# Patient Record
Sex: MALE | Race: WHITE | NOT HISPANIC OR LATINO | Employment: FULL TIME | URBAN - METROPOLITAN AREA
[De-identification: names, ages, dates, MRNs, and addresses within clinical notes are randomized per-mention and may not be internally consistent; named-entity substitution may affect disease eponyms.]

---

## 2017-04-23 ENCOUNTER — HOSPITAL ENCOUNTER (EMERGENCY)
Facility: HOSPITAL | Age: 42
Discharge: HOME | End: 2017-04-23
Attending: EMERGENCY MEDICINE | Admitting: EMERGENCY MEDICINE
Payer: COMMERCIAL

## 2017-04-23 VITALS
HEART RATE: 75 BPM | OXYGEN SATURATION: 98 % | TEMPERATURE: 99.6 F | SYSTOLIC BLOOD PRESSURE: 143 MMHG | DIASTOLIC BLOOD PRESSURE: 84 MMHG | WEIGHT: 155 LBS | RESPIRATION RATE: 16 BRPM | HEIGHT: 67 IN | BODY MASS INDEX: 24.33 KG/M2

## 2017-04-23 DIAGNOSIS — S39.012A LOW BACK STRAIN, INITIAL ENCOUNTER: Primary | ICD-10-CM

## 2017-04-23 PROCEDURE — 99283 EMERGENCY DEPT VISIT LOW MDM: CPT

## 2017-04-23 RX ORDER — IBUPROFEN 600 MG/1
600 TABLET ORAL EVERY 6 HOURS PRN
Qty: 30 TABLET | Refills: 0 | Status: SHIPPED | OUTPATIENT
Start: 2017-04-23 | End: 2020-01-03 | Stop reason: ALTCHOICE

## 2017-04-23 RX ORDER — CYCLOBENZAPRINE HCL 10 MG
10 TABLET ORAL 2 TIMES DAILY PRN
Qty: 10 TABLET | Refills: 0 | Status: SHIPPED | OUTPATIENT
Start: 2017-04-23 | End: 2020-01-03 | Stop reason: ALTCHOICE

## 2020-01-03 ENCOUNTER — OFFICE VISIT (OUTPATIENT)
Dept: FAMILY MEDICINE CLINIC | Facility: CLINIC | Age: 45
End: 2020-01-03
Payer: COMMERCIAL

## 2020-01-03 VITALS
SYSTOLIC BLOOD PRESSURE: 118 MMHG | HEIGHT: 68 IN | BODY MASS INDEX: 24.4 KG/M2 | OXYGEN SATURATION: 99 % | WEIGHT: 161 LBS | TEMPERATURE: 99.1 F | DIASTOLIC BLOOD PRESSURE: 72 MMHG | HEART RATE: 70 BPM

## 2020-01-03 DIAGNOSIS — Z11.4 ENCOUNTER FOR SCREENING FOR HUMAN IMMUNODEFICIENCY VIRUS (HIV): ICD-10-CM

## 2020-01-03 DIAGNOSIS — Z12.5 SCREENING PSA (PROSTATE SPECIFIC ANTIGEN): ICD-10-CM

## 2020-01-03 DIAGNOSIS — Z76.89 ENCOUNTER TO ESTABLISH CARE: ICD-10-CM

## 2020-01-03 DIAGNOSIS — Z00.00 WELL ADULT EXAM: Primary | ICD-10-CM

## 2020-01-03 PROCEDURE — 99386 PREV VISIT NEW AGE 40-64: CPT | Performed by: FAMILY MEDICINE

## 2020-01-03 NOTE — PROGRESS NOTES
[id number] [address] [place] [address]    NAME: Cyndi Del Rela  AGE: 38 y o  SEX: male  : 1975     DATE: 1/3/2020     Assessment and Plan:     Problem List Items Addressed This Visit        Other    Well adult exam - Primary    Relevant Orders    CBC and differential    Comprehensive metabolic panel    Lipid Panel with Direct LDL reflex        Patient was counseled and accepted HIV screening  Immunizations and preventive care screenings were discussed with patient today  Appropriate education was printed on patient's after visit summary  Counseling:  Alcohol/drug use: discussed moderation in alcohol intake, the recommendations for healthy alcohol use, and avoidance of illicit drug use  Dental Health: discussed importance of regular tooth brushing, flossing, and dental visits  Injury prevention: discussed safety/seat belts, safety helmets, smoke detectors, carbon dioxide detectors, and smoking near bedding or upholstery  Sexual health: discussed sexually transmitted diseases, partner selection, use of condoms, avoidance of unintended pregnancy, and contraceptive alternatives  · Exercise: the importance of regular exercise/physical activity was discussed  Recommend exercise 3-5 times per week for at least 30 minutes  Return in about 1 year (around 1/3/2021) for Annual physical      Chief Complaint:     Chief Complaint   Patient presents with    Establish Care      History of Present Illness:     Adult Annual Physical   Patient here for a comprehensive physical exam  The patient reports no problems  Wants labs  Dad had prostate cancer  Would like PSA  Diet and Physical Activity  · Diet/Nutrition: vegetarian  · Exercise: moderate cardiovascular exercise        Depression Screening  PHQ-9 Depression Screening    PHQ-9:    Frequency of the following problems over the past two weeks:       Little interest or pleasure in doing things:  0 - not at all  Feeling down, depressed, or hopeless:  0 - not at all  PHQ-2 Score:  0       General Health  · Sleep: sleeps well  · Hearing: no issues   · Vision: no vision problems  · Dental: regular dental visits  Review of Systems:     Review of Systems   Constitutional: Negative for activity change, appetite change, chills, fatigue, fever and unexpected weight change  HENT: Negative for congestion, ear discharge, ear pain, postnasal drip, sinus pressure and sore throat  Eyes: Negative for discharge and visual disturbance  Respiratory: Negative for cough, shortness of breath and wheezing  Cardiovascular: Negative for chest pain, palpitations and leg swelling  Gastrointestinal: Negative for abdominal pain, constipation, diarrhea, nausea and vomiting  Endocrine: Negative for cold intolerance, heat intolerance, polydipsia and polyuria  Genitourinary: Negative for difficulty urinating and frequency  Musculoskeletal: Negative for arthralgias, back pain, joint swelling and myalgias  Skin: Negative for rash  Neurological: Negative for dizziness, weakness, light-headedness, numbness and headaches  Hematological: Negative for adenopathy  Psychiatric/Behavioral: Negative for behavioral problems, confusion, dysphoric mood, sleep disturbance and suicidal ideas  The patient is not nervous/anxious  Past Medical History:     Past Medical History:   Diagnosis Date    Allergic Youth    Dogs, cats    Asthma Youth    When i was younger   I outgrew it      Past Surgical History:     Past Surgical History:   Procedure Laterality Date    TONSILLECTOMY        Family History:     Family History   Problem Relation Age of Onset    Cancer Father     Prostate cancer Father     Cancer Mother     Breast cancer Mother       Social History:     Social History     Socioeconomic History    Marital status: /Civil Union     Spouse name: None    Number of children: None    Years of education: None    Highest education level: None   Occupational History    None   Social Needs    Financial resource strain: None    Food insecurity:     Worry: None     Inability: None    Transportation needs:     Medical: None     Non-medical: None   Tobacco Use    Smoking status: Former Smoker     Packs/day: 1 00     Years: 5 00     Pack years: 5 00     Types: Cigarettes     Last attempt to quit: 1/1/2005     Years since quitting: 15 0    Smokeless tobacco: Never Used   Substance and Sexual Activity    Alcohol use: Not Currently     Comment: Recovering alcoholic    Drug use: Never    Sexual activity: Yes     Partners: Female     Comment: Wife is on birth control pills   Lifestyle    Physical activity:     Days per week: None     Minutes per session: None    Stress: None   Relationships    Social connections:     Talks on phone: None     Gets together: None     Attends Restorationist service: None     Active member of club or organization: None     Attends meetings of clubs or organizations: None     Relationship status: None    Intimate partner violence:     Fear of current or ex partner: None     Emotionally abused: None     Physically abused: None     Forced sexual activity: None   Other Topics Concern    None   Social History Narrative    None      Current Medications:     No current outpatient medications on file  No current facility-administered medications for this visit  Allergies: Allergies   Allergen Reactions    Penicillins Other (See Comments)     Unknown, childhood allergy      Physical Exam:     /72   Pulse 70   Temp 99 1 °F (37 3 °C)   Ht 5' 7 5" (1 715 m)   Wt 73 kg (161 lb)   SpO2 99%   BMI 24 84 kg/m²     Physical Exam   Constitutional: He is oriented to person, place, and time  He appears well-developed and well-nourished  No distress  HENT:   Head: Normocephalic and atraumatic     Right Ear: Hearing, tympanic membrane, external ear and ear canal normal  Left Ear: Hearing, tympanic membrane, external ear and ear canal normal    Nose: Nose normal    Mouth/Throat: Oropharynx is clear and moist and mucous membranes are normal  No oropharyngeal exudate  Eyes: Pupils are equal, round, and reactive to light  Conjunctivae and EOM are normal    Cardiovascular: Normal rate, regular rhythm and normal heart sounds  Exam reveals no gallop and no friction rub  No murmur heard  Pulmonary/Chest: Effort normal and breath sounds normal  No respiratory distress  He has no wheezes  He has no rales  He exhibits no tenderness  Abdominal: Soft  Bowel sounds are normal  He exhibits no distension and no mass  There is no tenderness  There is no rebound and no guarding  Musculoskeletal: He exhibits no edema  Neurological: He is alert and oriented to person, place, and time  Skin: Skin is warm and dry  No rash noted  He is not diaphoretic  Psychiatric: He has a normal mood and affect  His behavior is normal  Judgment and thought content normal    Nursing note and vitals reviewed        Gonsalo Bautista MD  [address] [place] [address] [id number]

## 2020-01-03 NOTE — PATIENT INSTRUCTIONS

## 2020-01-04 ENCOUNTER — LAB (OUTPATIENT)
Dept: LAB | Facility: CLINIC | Age: 45
End: 2020-01-04
Payer: COMMERCIAL

## 2020-01-04 DIAGNOSIS — Z00.00 WELL ADULT EXAM: ICD-10-CM

## 2020-01-04 DIAGNOSIS — Z11.4 ENCOUNTER FOR SCREENING FOR HUMAN IMMUNODEFICIENCY VIRUS (HIV): ICD-10-CM

## 2020-01-04 DIAGNOSIS — Z12.5 SCREENING PSA (PROSTATE SPECIFIC ANTIGEN): ICD-10-CM

## 2020-01-04 LAB
ALBUMIN SERPL BCP-MCNC: 4.2 G/DL (ref 3.5–5)
ALP SERPL-CCNC: 58 U/L (ref 46–116)
ALT SERPL W P-5'-P-CCNC: 28 U/L (ref 12–78)
ANION GAP SERPL CALCULATED.3IONS-SCNC: 3 MMOL/L (ref 4–13)
AST SERPL W P-5'-P-CCNC: 12 U/L (ref 5–45)
BASOPHILS # BLD AUTO: 0.03 THOUSANDS/ΜL (ref 0–0.1)
BASOPHILS NFR BLD AUTO: 1 % (ref 0–1)
BILIRUB SERPL-MCNC: 0.65 MG/DL (ref 0.2–1)
BUN SERPL-MCNC: 10 MG/DL (ref 5–25)
CALCIUM SERPL-MCNC: 9.2 MG/DL (ref 8.3–10.1)
CHLORIDE SERPL-SCNC: 111 MMOL/L (ref 100–108)
CHOLEST SERPL-MCNC: 195 MG/DL (ref 50–200)
CO2 SERPL-SCNC: 27 MMOL/L (ref 21–32)
CREAT SERPL-MCNC: 0.92 MG/DL (ref 0.6–1.3)
EOSINOPHIL # BLD AUTO: 0.48 THOUSAND/ΜL (ref 0–0.61)
EOSINOPHIL NFR BLD AUTO: 10 % (ref 0–6)
ERYTHROCYTE [DISTWIDTH] IN BLOOD BY AUTOMATED COUNT: 11.7 % (ref 11.6–15.1)
GFR SERPL CREATININE-BSD FRML MDRD: 100 ML/MIN/1.73SQ M
GLUCOSE P FAST SERPL-MCNC: 88 MG/DL (ref 65–99)
HCT VFR BLD AUTO: 46.4 % (ref 36.5–49.3)
HDLC SERPL-MCNC: 59 MG/DL
HGB BLD-MCNC: 15.4 G/DL (ref 12–17)
IMM GRANULOCYTES # BLD AUTO: 0.01 THOUSAND/UL (ref 0–0.2)
IMM GRANULOCYTES NFR BLD AUTO: 0 % (ref 0–2)
LDLC SERPL CALC-MCNC: 116 MG/DL (ref 0–100)
LYMPHOCYTES # BLD AUTO: 1.67 THOUSANDS/ΜL (ref 0.6–4.47)
LYMPHOCYTES NFR BLD AUTO: 36 % (ref 14–44)
MCH RBC QN AUTO: 31.2 PG (ref 26.8–34.3)
MCHC RBC AUTO-ENTMCNC: 33.2 G/DL (ref 31.4–37.4)
MCV RBC AUTO: 94 FL (ref 82–98)
MONOCYTES # BLD AUTO: 0.34 THOUSAND/ΜL (ref 0.17–1.22)
MONOCYTES NFR BLD AUTO: 7 % (ref 4–12)
NEUTROPHILS # BLD AUTO: 2.18 THOUSANDS/ΜL (ref 1.85–7.62)
NEUTS SEG NFR BLD AUTO: 46 % (ref 43–75)
NRBC BLD AUTO-RTO: 0 /100 WBCS
PLATELET # BLD AUTO: 238 THOUSANDS/UL (ref 149–390)
PMV BLD AUTO: 11.1 FL (ref 8.9–12.7)
POTASSIUM SERPL-SCNC: 4.3 MMOL/L (ref 3.5–5.3)
PROT SERPL-MCNC: 6.7 G/DL (ref 6.4–8.2)
PSA SERPL-MCNC: 0.8 NG/ML (ref 0–4)
RBC # BLD AUTO: 4.94 MILLION/UL (ref 3.88–5.62)
SODIUM SERPL-SCNC: 141 MMOL/L (ref 136–145)
TRIGL SERPL-MCNC: 102 MG/DL
WBC # BLD AUTO: 4.71 THOUSAND/UL (ref 4.31–10.16)

## 2020-01-04 PROCEDURE — 80061 LIPID PANEL: CPT

## 2020-01-04 PROCEDURE — 85025 COMPLETE CBC W/AUTO DIFF WBC: CPT

## 2020-01-04 PROCEDURE — 80053 COMPREHEN METABOLIC PANEL: CPT

## 2020-01-04 PROCEDURE — 36415 COLL VENOUS BLD VENIPUNCTURE: CPT

## 2020-01-04 PROCEDURE — 87389 HIV-1 AG W/HIV-1&-2 AB AG IA: CPT

## 2020-01-04 PROCEDURE — G0103 PSA SCREENING: HCPCS

## 2020-01-06 LAB — HIV 1+2 AB+HIV1 P24 AG SERPL QL IA: NORMAL

## 2020-09-24 ENCOUNTER — APPOINTMENT (OUTPATIENT)
Dept: RADIOLOGY | Age: 45
End: 2020-09-24
Payer: OTHER MISCELLANEOUS

## 2020-09-24 ENCOUNTER — OCCMED (OUTPATIENT)
Dept: URGENT CARE | Age: 45
End: 2020-09-24
Payer: OTHER MISCELLANEOUS

## 2020-09-24 DIAGNOSIS — M25.532 WRIST PAIN, ACUTE, LEFT: Primary | ICD-10-CM

## 2020-09-24 DIAGNOSIS — M25.532 WRIST PAIN, ACUTE, LEFT: ICD-10-CM

## 2020-09-24 PROCEDURE — 73130 X-RAY EXAM OF HAND: CPT

## 2020-09-24 PROCEDURE — G0382 LEV 3 HOSP TYPE B ED VISIT: HCPCS | Performed by: NURSE PRACTITIONER

## 2020-09-24 PROCEDURE — 73110 X-RAY EXAM OF WRIST: CPT

## 2020-09-24 PROCEDURE — 99283 EMERGENCY DEPT VISIT LOW MDM: CPT | Performed by: NURSE PRACTITIONER

## 2020-09-25 VITALS
WEIGHT: 161 LBS | HEIGHT: 68 IN | SYSTOLIC BLOOD PRESSURE: 120 MMHG | BODY MASS INDEX: 24.4 KG/M2 | DIASTOLIC BLOOD PRESSURE: 85 MMHG | HEART RATE: 73 BPM

## 2020-09-25 DIAGNOSIS — S52.502A CLOSED FRACTURE DISTAL RADIUS AND ULNA, LEFT, INITIAL ENCOUNTER: Primary | ICD-10-CM

## 2020-09-25 DIAGNOSIS — S52.602A CLOSED FRACTURE DISTAL RADIUS AND ULNA, LEFT, INITIAL ENCOUNTER: Primary | ICD-10-CM

## 2020-09-25 PROCEDURE — 99204 OFFICE O/P NEW MOD 45 MIN: CPT | Performed by: ORTHOPAEDIC SURGERY

## 2020-09-25 RX ORDER — OXYCODONE HYDROCHLORIDE AND ACETAMINOPHEN 5; 325 MG/1; MG/1
1 TABLET ORAL EVERY 4 HOURS PRN
Qty: 30 TABLET | Refills: 0 | Status: SHIPPED | OUTPATIENT
Start: 2020-09-25 | End: 2020-11-24

## 2020-09-25 RX ORDER — ACETAMINOPHEN 500 MG
TABLET ORAL
Qty: 30 TABLET | Refills: 0 | Status: SHIPPED | OUTPATIENT
Start: 2020-09-25 | End: 2020-11-24

## 2020-09-25 RX ORDER — IBUPROFEN 600 MG/1
TABLET ORAL
Qty: 30 TABLET | Refills: 0 | Status: SHIPPED | OUTPATIENT
Start: 2020-09-25 | End: 2020-11-24

## 2020-09-25 RX ORDER — CLINDAMYCIN PHOSPHATE 900 MG/50ML
900 INJECTION INTRAVENOUS ONCE
Status: CANCELLED | OUTPATIENT
Start: 2020-09-25 | End: 2020-09-25

## 2020-09-25 NOTE — H&P (VIEW-ONLY)
CHIEF COMPLAINT:  Chief Complaint   Patient presents with    Left Wrist - Pain, Fracture       SUBJECTIVE:  Khalida Riggins is a 38y o  year old  male who presents to the office for evaluation of his left wrist   Patient tripped yesterday at work over a [place] landing on his outstretched hand  Patient seen in urgent care where x-rays were performed and patient was placed into a splint  Patient has been taking Tylenol and ibuprofen for pain  Patient does mention some numbness in his thumb that occurred yesterday but resolved  Patient denies previous injury to his left wrist       The patient denies any cardiac or pulmonary issues  Denies diabetes  Denies any history of MI, gastric ulcers, kidney or liver issues  Denies blood thinners  PAST MEDICAL HISTORY:  Past Medical History:   Diagnosis Date    Allergic Youth    Dogs, cats    Asthma Youth    When i was younger   I outgrew it       PAST SURGICAL HISTORY:  Past Surgical History:   Procedure Laterality Date    TONSILLECTOMY         FAMILY HISTORY:  Family History   Problem Relation Age of Onset    Cancer Father     Prostate cancer Father     Cancer Mother     Breast cancer Mother        SOCIAL HISTORY:  Social History     Tobacco Use    Smoking status: Former Smoker     Packs/day: 1 00     Years: 5 00     Pack years: 5 00     Types: Cigarettes     Last attempt to quit: 1/1/2005     Years since quitting: 15 7    Smokeless tobacco: Never Used   Substance Use Topics    Alcohol use: Not Currently     Comment: Recovering alcoholic    Drug use: Never       MEDICATIONS:    Current Outpatient Medications:     acetaminophen (TYLENOL) 500 mg tablet, Take one tablet the day of surgery, then take one tablet for breakfast lunch and dinner after surgery for 5 days, Disp: 30 tablet, Rfl: 0    ibuprofen (MOTRIN) 600 mg tablet, Take one tablet the day of surgery, then take one tablet for breakfast lunch and dinner after surgery for 5 days, Disp: 30 tablet, Rfl: 0    oxyCODONE-acetaminophen (PERCOCET) 5-325 mg per tablet, Take 1 tablet by mouth every 4 (four) hours as needed for moderate pain To be taken after surgeryMax Daily Amount: 6 tablets, Disp: 30 tablet, Rfl: 0    ALLERGIES:  Allergies   Allergen Reactions    Penicillins Other (See Comments)     Unknown, childhood allergy       REVIEW OF SYSTEMS:  Review of Systems   Constitutional: Negative for chills, fever and unexpected weight change  HENT: Negative for hearing loss, nosebleeds and sore throat  Eyes: Negative for pain, redness and visual disturbance  Respiratory: Negative for cough, shortness of breath and wheezing  Cardiovascular: Negative for chest pain, palpitations and leg swelling  Gastrointestinal: Negative for abdominal pain, nausea and vomiting  Endocrine: Negative for polydipsia and polyuria  Genitourinary: Negative for difficulty urinating, dysuria and hematuria  Skin: Negative for rash and wound  Neurological: Negative for dizziness, light-headedness and headaches  Psychiatric/Behavioral: Negative for decreased concentration, dysphoric mood and suicidal ideas  The patient is not nervous/anxious          VITALS:  Vitals:    09/25/20 0919   BP: 120/85   Pulse: 73       LABS:  HgA1c: No results found for: HGBA1C  BMP:   Lab Results   Component Value Date    CALCIUM 9 2 01/04/2020    K 4 3 01/04/2020    CO2 27 01/04/2020     (H) 01/04/2020    BUN 10 01/04/2020    CREATININE 0 92 01/04/2020       _____________________________________________________  PHYSICAL EXAMINATION:  General: well developed and well nourished, alert, oriented times 3 and appears comfortable  Psychiatric: Normal  HEENT: Trachea Midline, No torticollis  Pulmonary: No audible wheezing or strider  Cardiovascular: No discernable arrhythmia   Skin: No masses, erythema, lacerations, fluctation, ulcerations  Neurovascular: Sensation Intact to the Median, Ulnar, Radial Nerve, Motor Intact to the Median, Ulnar, Radial Nerve and Pulses Intact    MUSCULOSKELETAL EXAMINATION:  Left wrist     Stiff motion of fingers  Fullness over the dorsal aspect of wrist  Motion not tested due to injury  ___________________________________________________  STUDIES REVIEWED:  X-rays of the left wrist performed 09/24/2020 show left distal radius intra-articular fracture, ulnar styloid fracture      PROCEDURES PERFORMED:  Procedures  No Procedures performed today    _____________________________________________________  ASSESSMENT/PLAN:    Left distal radius intraarticular fracture   Pt has failed conservative measures  ORIF left distal radius  was discussed at length today including the risks and benefits  Pt understands and wants to proceed  *Surgery- left distal radius ORIF    * detailed consent was signed in the office   * anesthesia- regional with sedation    * antibiotics- ordered    * OT order was placed   * Post op medication was sent to the office on file    Surgery medication instructions: You will stop eating and drinking at midnight the night before your surgery, but you may continue to take your normal medications with a small sip of water  In the morning on the day of your surgery, we would like you to take the following medication:   Tylenol 500mg one tablet by mouth    After surgery, we would like you to take Tylenol 500 mg one tablet by mouth every 6 hours  (at breakfast, lunch and dinner) for 5-7 days after your surgery  Please take this medication EVERYDAY after surgery for 5-7 days, and not just as needed  Taking this medications after surgery will limit your need for prescription pain medication  We will also prescribe a narcotic pain medication for a limited time after surgery that you can take as needed for moderate or severe pain  The narcotic pain medication may also have Tylenol in it  Please limit Tylenol usage to under 3,000mg a day          Diagnoses and all orders for this visit:    Closed fracture distal radius and ulna, left, initial encounter  -     Ambulatory referral to PT/OT hand therapy; Future  -     Case request operating room: OPEN REDUCTION W/ INTERNAL FIXATION (ORIF) RADIUS / ULNA (WRIST); Standing  -     acetaminophen (TYLENOL) 500 mg tablet; Take one tablet the day of surgery, then take one tablet for breakfast lunch and dinner after surgery for 5 days  -     ibuprofen (MOTRIN) 600 mg tablet; Take one tablet the day of surgery, then take one tablet for breakfast lunch and dinner after surgery for 5 days  -     oxyCODONE-acetaminophen (PERCOCET) 5-325 mg per tablet; Take 1 tablet by mouth every 4 (four) hours as needed for moderate pain To be taken after surgeryMax Daily Amount: 6 tablets  -     Case request operating room: OPEN REDUCTION W/ INTERNAL FIXATION (ORIF) RADIUS / ULNA (WRIST)    Other orders  -     Diet NPO; Sips with meds; Standing  -     Height and weight upon arrival; Standing  -     Void on call to OR; Standing  -     Insert peripheral IV; Standing  -     clindamycin (CLEOCIN) IVPB (premix in dextrose) 900 mg 50 mL        Follow Up:  Return for after surgery  To Do Next Visit:  Re-evaluation of current issue        Operative Discussions:  Fracture Operative Treatment: The physiology of a fractured bone was discussed with the patient today  With displaced fractures, operative treatment often results in a functional recovery  Typically, these fractures undergo reduction either through percutaneous or open methods depending on the location and fracture pattern  Radiographs are typically taken at intervals throughout the fracture healing ensure maintenance of reduction and alignment  If the fracture loses its alignment, revision surgery may be required  Medical conditions such as diabetes, osteoporosis, vitamin D deficiency, and a history of or exposure to smoking may delay or prevent fracture healing    The risks and benefits of the procedure were explained to the patient, which include, but are not limited to: Bleeding, infection, recurrence, pain, scar, malunion, nonunion, damage to tendons, damage to nerves, and damage to blood vessels, and complications related to anesthesia, failure to give desire result, need for more surgery  These risks, along with alternative conservative treatment options, and postoperative protocols were voiced back and understood by the patient  All questions were answered to the patient's satisfaction  The patient agrees to comply with a standard postoperative protocol, and is willing to proceed  Education was provided via written and auditory forms  There were no barriers to learning  Written handouts regarding wound care, incision and scar care, and general preoperative information was provided to the patient  Prior to surgery, the patient may be requested to stop all anti-inflammatory medications  Prophylactic aspirin, Plavix, and Coumadin may be allowed to be continued  Medications including vitamin E , ginkgo, and fish oil are requested to be stopped approximately one week prior to surgery  Hypertensive medications and beta blockers, if taken, should be continued      Scribe Attestation    I,:   Hunter Brownlee am acting as a scribe while in the presence of the attending physician :        I,:   Yuri Pastor MD personally performed the services described in this documentation    as scribed in my presence :

## 2020-09-25 NOTE — PRE-PROCEDURE INSTRUCTIONS
Pre-Surgery Instructions:   Medication Instructions    acetaminophen (TYLENOL) 500 mg tablet Instructed patient per Anesthesia Guidelines   ibuprofen (MOTRIN) 600 mg tablet Instructed patient per Anesthesia Guidelines   oxyCODONE-acetaminophen (PERCOCET) 5-325 mg per tablet Instructed patient per Anesthesia Guidelines

## 2020-09-25 NOTE — H&P
CHIEF COMPLAINT:  Chief Complaint   Patient presents with    Left Wrist - Pain, Fracture       SUBJECTIVE:  Osmani Hurtado is a 38y o  year old  male who presents to the office for evaluation of his left wrist   Patient tripped yesterday at work over a [place] landing on his outstretched hand  Patient seen in urgent care where x-rays were performed and patient was placed into a splint  Patient has been taking Tylenol and ibuprofen for pain  Patient does mention some numbness in his thumb that occurred yesterday but resolved  Patient denies previous injury to his left wrist       The patient denies any cardiac or pulmonary issues  Denies diabetes  Denies any history of MI, gastric ulcers, kidney or liver issues  Denies blood thinners  PAST MEDICAL HISTORY:  Past Medical History:   Diagnosis Date    Allergic Youth    Dogs, cats    Asthma Youth    When i was younger   I outgrew it       PAST SURGICAL HISTORY:  Past Surgical History:   Procedure Laterality Date    TONSILLECTOMY         FAMILY HISTORY:  Family History   Problem Relation Age of Onset    Cancer Father     Prostate cancer Father     Cancer Mother     Breast cancer Mother        SOCIAL HISTORY:  Social History     Tobacco Use    Smoking status: Former Smoker     Packs/day: 1 00     Years: 5 00     Pack years: 5 00     Types: Cigarettes     Last attempt to quit: 1/1/2005     Years since quitting: 15 7    Smokeless tobacco: Never Used   Substance Use Topics    Alcohol use: Not Currently     Comment: Recovering alcoholic    Drug use: Never       MEDICATIONS:    Current Outpatient Medications:     acetaminophen (TYLENOL) 500 mg tablet, Take one tablet the day of surgery, then take one tablet for breakfast lunch and dinner after surgery for 5 days, Disp: 30 tablet, Rfl: 0    ibuprofen (MOTRIN) 600 mg tablet, Take one tablet the day of surgery, then take one tablet for breakfast lunch and dinner after surgery for 5 days, Disp: 30 tablet, Rfl: 0    oxyCODONE-acetaminophen (PERCOCET) 5-325 mg per tablet, Take 1 tablet by mouth every 4 (four) hours as needed for moderate pain To be taken after surgeryMax Daily Amount: 6 tablets, Disp: 30 tablet, Rfl: 0    ALLERGIES:  Allergies   Allergen Reactions    Penicillins Other (See Comments)     Unknown, childhood allergy       REVIEW OF SYSTEMS:  Review of Systems   Constitutional: Negative for chills, fever and unexpected weight change  HENT: Negative for hearing loss, nosebleeds and sore throat  Eyes: Negative for pain, redness and visual disturbance  Respiratory: Negative for cough, shortness of breath and wheezing  Cardiovascular: Negative for chest pain, palpitations and leg swelling  Gastrointestinal: Negative for abdominal pain, nausea and vomiting  Endocrine: Negative for polydipsia and polyuria  Genitourinary: Negative for difficulty urinating, dysuria and hematuria  Skin: Negative for rash and wound  Neurological: Negative for dizziness, light-headedness and headaches  Psychiatric/Behavioral: Negative for decreased concentration, dysphoric mood and suicidal ideas  The patient is not nervous/anxious          VITALS:  Vitals:    09/25/20 0919   BP: 120/85   Pulse: 73       LABS:  HgA1c: No results found for: HGBA1C  BMP:   Lab Results   Component Value Date    CALCIUM 9 2 01/04/2020    K 4 3 01/04/2020    CO2 27 01/04/2020     (H) 01/04/2020    BUN 10 01/04/2020    CREATININE 0 92 01/04/2020       _____________________________________________________  PHYSICAL EXAMINATION:  General: well developed and well nourished, alert, oriented times 3 and appears comfortable  Psychiatric: Normal  HEENT: Trachea Midline, No torticollis  Pulmonary: No audible wheezing or strider  Cardiovascular: No discernable arrhythmia   Skin: No masses, erythema, lacerations, fluctation, ulcerations  Neurovascular: Sensation Intact to the Median, Ulnar, Radial Nerve, Motor Intact to the Median, Ulnar, Radial Nerve and Pulses Intact    MUSCULOSKELETAL EXAMINATION:  Left wrist     Stiff motion of fingers  Fullness over the dorsal aspect of wrist  Motion not tested due to injury  ___________________________________________________  STUDIES REVIEWED:  X-rays of the left wrist performed 09/24/2020 show left distal radius intra-articular fracture, ulnar styloid fracture      PROCEDURES PERFORMED:  Procedures  No Procedures performed today    _____________________________________________________  ASSESSMENT/PLAN:    Left distal radius intraarticular fracture   Pt has failed conservative measures  ORIF left distal radius  was discussed at length today including the risks and benefits  Pt understands and wants to proceed  *Surgery- left distal radius ORIF    * detailed consent was signed in the office   * anesthesia- regional with sedation    * antibiotics- ordered    * OT order was placed   * Post op medication was sent to the office on file    Surgery medication instructions: You will stop eating and drinking at midnight the night before your surgery, but you may continue to take your normal medications with a small sip of water  In the morning on the day of your surgery, we would like you to take the following medication:   Tylenol 500mg one tablet by mouth    After surgery, we would like you to take Tylenol 500 mg one tablet by mouth every 6 hours  (at breakfast, lunch and dinner) for 5-7 days after your surgery  Please take this medication EVERYDAY after surgery for 5-7 days, and not just as needed  Taking this medications after surgery will limit your need for prescription pain medication  We will also prescribe a narcotic pain medication for a limited time after surgery that you can take as needed for moderate or severe pain  The narcotic pain medication may also have Tylenol in it  Please limit Tylenol usage to under 3,000mg a day          Diagnoses and all orders for this visit:    Closed fracture distal radius and ulna, left, initial encounter  -     Ambulatory referral to PT/OT hand therapy; Future  -     Case request operating room: OPEN REDUCTION W/ INTERNAL FIXATION (ORIF) RADIUS / ULNA (WRIST); Standing  -     acetaminophen (TYLENOL) 500 mg tablet; Take one tablet the day of surgery, then take one tablet for breakfast lunch and dinner after surgery for 5 days  -     ibuprofen (MOTRIN) 600 mg tablet; Take one tablet the day of surgery, then take one tablet for breakfast lunch and dinner after surgery for 5 days  -     oxyCODONE-acetaminophen (PERCOCET) 5-325 mg per tablet; Take 1 tablet by mouth every 4 (four) hours as needed for moderate pain To be taken after surgeryMax Daily Amount: 6 tablets  -     Case request operating room: OPEN REDUCTION W/ INTERNAL FIXATION (ORIF) RADIUS / ULNA (WRIST)    Other orders  -     Diet NPO; Sips with meds; Standing  -     Height and weight upon arrival; Standing  -     Void on call to OR; Standing  -     Insert peripheral IV; Standing  -     clindamycin (CLEOCIN) IVPB (premix in dextrose) 900 mg 50 mL        Follow Up:  Return for after surgery  To Do Next Visit:  Re-evaluation of current issue        Operative Discussions:  Fracture Operative Treatment: The physiology of a fractured bone was discussed with the patient today  With displaced fractures, operative treatment often results in a functional recovery  Typically, these fractures undergo reduction either through percutaneous or open methods depending on the location and fracture pattern  Radiographs are typically taken at intervals throughout the fracture healing ensure maintenance of reduction and alignment  If the fracture loses its alignment, revision surgery may be required  Medical conditions such as diabetes, osteoporosis, vitamin D deficiency, and a history of or exposure to smoking may delay or prevent fracture healing    The risks and benefits of the procedure were explained to the patient, which include, but are not limited to: Bleeding, infection, recurrence, pain, scar, malunion, nonunion, damage to tendons, damage to nerves, and damage to blood vessels, and complications related to anesthesia, failure to give desire result, need for more surgery  These risks, along with alternative conservative treatment options, and postoperative protocols were voiced back and understood by the patient  All questions were answered to the patient's satisfaction  The patient agrees to comply with a standard postoperative protocol, and is willing to proceed  Education was provided via written and auditory forms  There were no barriers to learning  Written handouts regarding wound care, incision and scar care, and general preoperative information was provided to the patient  Prior to surgery, the patient may be requested to stop all anti-inflammatory medications  Prophylactic aspirin, Plavix, and Coumadin may be allowed to be continued  Medications including vitamin E , ginkgo, and fish oil are requested to be stopped approximately one week prior to surgery  Hypertensive medications and beta blockers, if taken, should be continued      Scribe Attestation    I,:   Maggie Rowan am acting as a scribe while in the presence of the attending physician :        I,:   Jossy Bates MD personally performed the services described in this documentation    as scribed in my presence :

## 2020-09-25 NOTE — PROGRESS NOTES
CHIEF COMPLAINT:  Chief Complaint   Patient presents with    Left Wrist - Pain, Fracture       SUBJECTIVE:  Gonzalo Pittman is a 38y o  year old  male who presents to the office for evaluation of his left wrist   Patient tripped yesterday at work over a [place] landing on his outstretched hand  Patient seen in urgent care where x-rays were performed and patient was placed into a splint  Patient has been taking Tylenol and ibuprofen for pain  Patient does mention some numbness in his thumb that occurred yesterday but resolved  Patient denies previous injury to his left wrist       The patient denies any cardiac or pulmonary issues  Denies diabetes  Denies any history of MI, gastric ulcers, kidney or liver issues  Denies blood thinners  PAST MEDICAL HISTORY:  Past Medical History:   Diagnosis Date    Allergic Youth    Dogs, cats    Asthma Youth    When i was younger   I outgrew it       PAST SURGICAL HISTORY:  Past Surgical History:   Procedure Laterality Date    TONSILLECTOMY         FAMILY HISTORY:  Family History   Problem Relation Age of Onset    Cancer Father     Prostate cancer Father     Cancer Mother     Breast cancer Mother        SOCIAL HISTORY:  Social History     Tobacco Use    Smoking status: Former Smoker     Packs/day: 1 00     Years: 5 00     Pack years: 5 00     Types: Cigarettes     Last attempt to quit: 1/1/2005     Years since quitting: 15 7    Smokeless tobacco: Never Used   Substance Use Topics    Alcohol use: Not Currently     Comment: Recovering alcoholic    Drug use: Never       MEDICATIONS:    Current Outpatient Medications:     acetaminophen (TYLENOL) 500 mg tablet, Take one tablet the day of surgery, then take one tablet for breakfast lunch and dinner after surgery for 5 days, Disp: 30 tablet, Rfl: 0    ibuprofen (MOTRIN) 600 mg tablet, Take one tablet the day of surgery, then take one tablet for breakfast lunch and dinner after surgery for 5 days, Disp: 30 tablet, Rfl: 0    oxyCODONE-acetaminophen (PERCOCET) 5-325 mg per tablet, Take 1 tablet by mouth every 4 (four) hours as needed for moderate pain To be taken after surgeryMax Daily Amount: 6 tablets, Disp: 30 tablet, Rfl: 0    ALLERGIES:  Allergies   Allergen Reactions    Penicillins Other (See Comments)     Unknown, childhood allergy       REVIEW OF SYSTEMS:  Review of Systems   Constitutional: Negative for chills, fever and unexpected weight change  HENT: Negative for hearing loss, nosebleeds and sore throat  Eyes: Negative for pain, redness and visual disturbance  Respiratory: Negative for cough, shortness of breath and wheezing  Cardiovascular: Negative for chest pain, palpitations and leg swelling  Gastrointestinal: Negative for abdominal pain, nausea and vomiting  Endocrine: Negative for polydipsia and polyuria  Genitourinary: Negative for difficulty urinating, dysuria and hematuria  Skin: Negative for rash and wound  Neurological: Negative for dizziness, light-headedness and headaches  Psychiatric/Behavioral: Negative for decreased concentration, dysphoric mood and suicidal ideas  The patient is not nervous/anxious          VITALS:  Vitals:    09/25/20 0919   BP: 120/85   Pulse: 73       LABS:  HgA1c: No results found for: HGBA1C  BMP:   Lab Results   Component Value Date    CALCIUM 9 2 01/04/2020    K 4 3 01/04/2020    CO2 27 01/04/2020     (H) 01/04/2020    BUN 10 01/04/2020    CREATININE 0 92 01/04/2020       _____________________________________________________  PHYSICAL EXAMINATION:  General: well developed and well nourished, alert, oriented times 3 and appears comfortable  Psychiatric: Normal  HEENT: Trachea Midline, No torticollis  Pulmonary: No audible wheezing or strider  Cardiovascular: No discernable arrhythmia   Skin: No masses, erythema, lacerations, fluctation, ulcerations  Neurovascular: Sensation Intact to the Median, Ulnar, Radial Nerve, Motor Intact to the Median, Ulnar, Radial Nerve and Pulses Intact    MUSCULOSKELETAL EXAMINATION:  Left wrist     Stiff motion of fingers  Fullness over the dorsal aspect of wrist  Motion not tested due to injury  ___________________________________________________  STUDIES REVIEWED:  X-rays of the left wrist performed 09/24/2020 show left distal radius intra-articular fracture, ulnar styloid fracture      PROCEDURES PERFORMED:  Procedures  No Procedures performed today    _____________________________________________________  ASSESSMENT/PLAN:    Left distal radius intraarticular fracture   Pt has failed conservative measures  ORIF left distal radius  was discussed at length today including the risks and benefits  Pt understands and wants to proceed  *Surgery- left distal radius ORIF    * detailed consent was signed in the office   * anesthesia- regional with sedation    * antibiotics- ordered    * OT order was placed   * Post op medication was sent to the office on file    Surgery medication instructions: You will stop eating and drinking at midnight the night before your surgery, but you may continue to take your normal medications with a small sip of water  In the morning on the day of your surgery, we would like you to take the following medication:   Tylenol 500mg one tablet by mouth    After surgery, we would like you to take Tylenol 500 mg one tablet by mouth every 6 hours  (at breakfast, lunch and dinner) for 5-7 days after your surgery  Please take this medication EVERYDAY after surgery for 5-7 days, and not just as needed  Taking this medications after surgery will limit your need for prescription pain medication  We will also prescribe a narcotic pain medication for a limited time after surgery that you can take as needed for moderate or severe pain  The narcotic pain medication may also have Tylenol in it  Please limit Tylenol usage to under 3,000mg a day          Diagnoses and all orders for this visit:    Closed fracture distal radius and ulna, left, initial encounter  -     Ambulatory referral to PT/OT hand therapy; Future  -     Case request operating room: OPEN REDUCTION W/ INTERNAL FIXATION (ORIF) RADIUS / ULNA (WRIST); Standing  -     acetaminophen (TYLENOL) 500 mg tablet; Take one tablet the day of surgery, then take one tablet for breakfast lunch and dinner after surgery for 5 days  -     ibuprofen (MOTRIN) 600 mg tablet; Take one tablet the day of surgery, then take one tablet for breakfast lunch and dinner after surgery for 5 days  -     oxyCODONE-acetaminophen (PERCOCET) 5-325 mg per tablet; Take 1 tablet by mouth every 4 (four) hours as needed for moderate pain To be taken after surgeryMax Daily Amount: 6 tablets  -     Case request operating room: OPEN REDUCTION W/ INTERNAL FIXATION (ORIF) RADIUS / ULNA (WRIST)    Other orders  -     Diet NPO; Sips with meds; Standing  -     Height and weight upon arrival; Standing  -     Void on call to OR; Standing  -     Insert peripheral IV; Standing  -     clindamycin (CLEOCIN) IVPB (premix in dextrose) 900 mg 50 mL        Follow Up:  Return for after surgery  To Do Next Visit:  Re-evaluation of current issue        Operative Discussions:  Fracture Operative Treatment: The physiology of a fractured bone was discussed with the patient today  With displaced fractures, operative treatment often results in a functional recovery  Typically, these fractures undergo reduction either through percutaneous or open methods depending on the location and fracture pattern  Radiographs are typically taken at intervals throughout the fracture healing ensure maintenance of reduction and alignment  If the fracture loses its alignment, revision surgery may be required  Medical conditions such as diabetes, osteoporosis, vitamin D deficiency, and a history of or exposure to smoking may delay or prevent fracture healing    The risks and benefits of the procedure were explained to the patient, which include, but are not limited to: Bleeding, infection, recurrence, pain, scar, malunion, nonunion, damage to tendons, damage to nerves, and damage to blood vessels, and complications related to anesthesia, failure to give desire result, need for more surgery  These risks, along with alternative conservative treatment options, and postoperative protocols were voiced back and understood by the patient  All questions were answered to the patient's satisfaction  The patient agrees to comply with a standard postoperative protocol, and is willing to proceed  Education was provided via written and auditory forms  There were no barriers to learning  Written handouts regarding wound care, incision and scar care, and general preoperative information was provided to the patient  Prior to surgery, the patient may be requested to stop all anti-inflammatory medications  Prophylactic aspirin, Plavix, and Coumadin may be allowed to be continued  Medications including vitamin E , ginkgo, and fish oil are requested to be stopped approximately one week prior to surgery  Hypertensive medications and beta blockers, if taken, should be continued      Scribe Attestation    I,:   Cele Kolb am acting as a scribe while in the presence of the attending physician :        I,:   Albania Mann MD personally performed the services described in this documentation    as scribed in my presence :

## 2020-09-28 ENCOUNTER — ANESTHESIA EVENT (OUTPATIENT)
Dept: PERIOP | Facility: HOSPITAL | Age: 45
End: 2020-09-28
Payer: OTHER MISCELLANEOUS

## 2020-09-29 ENCOUNTER — HOSPITAL ENCOUNTER (OUTPATIENT)
Facility: HOSPITAL | Age: 45
Setting detail: OUTPATIENT SURGERY
Discharge: HOME | End: 2020-09-29
Attending: ORTHOPAEDIC SURGERY | Admitting: ORTHOPAEDIC SURGERY
Payer: OTHER MISCELLANEOUS

## 2020-09-29 ENCOUNTER — APPOINTMENT (OUTPATIENT)
Dept: RADIOLOGY | Facility: HOSPITAL | Age: 45
End: 2020-09-29
Payer: OTHER MISCELLANEOUS

## 2020-09-29 ENCOUNTER — ANESTHESIA (OUTPATIENT)
Dept: PERIOP | Facility: HOSPITAL | Age: 45
End: 2020-09-29
Payer: OTHER MISCELLANEOUS

## 2020-09-29 VITALS
SYSTOLIC BLOOD PRESSURE: 103 MMHG | HEART RATE: 73 BPM | TEMPERATURE: 97.8 F | WEIGHT: 153.66 LBS | OXYGEN SATURATION: 96 % | DIASTOLIC BLOOD PRESSURE: 61 MMHG | HEIGHT: 68 IN | RESPIRATION RATE: 22 BRPM | BODY MASS INDEX: 23.29 KG/M2

## 2020-09-29 VITALS — HEART RATE: 77 BPM

## 2020-09-29 PROCEDURE — C1713 ANCHOR/SCREW BN/BN,TIS/BN: HCPCS | Performed by: ORTHOPAEDIC SURGERY

## 2020-09-29 PROCEDURE — 25608 OPTX DST RD XART FX/EPI SEP2: CPT | Performed by: ORTHOPAEDIC SURGERY

## 2020-09-29 PROCEDURE — 73100 X-RAY EXAM OF WRIST: CPT

## 2020-09-29 PROCEDURE — 25608 OPTX DST RD XART FX/EPI SEP2: CPT | Performed by: PHYSICIAN ASSISTANT

## 2020-09-29 DEVICE — ACU-LOC® 2 VDR PLT, WIDE, L
Type: IMPLANTABLE DEVICE | Site: WRIST | Status: FUNCTIONAL
Brand: ACUMED

## 2020-09-29 DEVICE — 2.3MM X 20MM LOCKING CORTICAL SCREW
Type: IMPLANTABLE DEVICE | Site: WRIST | Status: FUNCTIONAL
Brand: ACUMED

## 2020-09-29 DEVICE — 3.5MM X 14.0MM LOCKING CORTICAL SCREW
Type: IMPLANTABLE DEVICE | Site: WRIST | Status: FUNCTIONAL
Brand: ACUMED

## 2020-09-29 DEVICE — 2.3MM X 20MM LOCKING CORTICAL PEG
Type: IMPLANTABLE DEVICE | Site: WRIST | Status: FUNCTIONAL
Brand: ACUMED

## 2020-09-29 DEVICE — 3.5MM X 14.0MM CORTICAL SCREW
Type: IMPLANTABLE DEVICE | Site: WRIST | Status: FUNCTIONAL
Brand: ACUMED

## 2020-09-29 DEVICE — 2.3MM X 22MM LOCKING CORTICAL SCREW
Type: IMPLANTABLE DEVICE | Site: WRIST | Status: FUNCTIONAL
Brand: ACUMED

## 2020-09-29 DEVICE — 2.3MM X 22MM LOCKING CORTICAL PEG
Type: IMPLANTABLE DEVICE | Site: WRIST | Status: FUNCTIONAL
Brand: ACUMED

## 2020-09-29 DEVICE — 3.5MM X 16.0MM LOCKING CORTICAL SCREW
Type: IMPLANTABLE DEVICE | Site: WRIST | Status: FUNCTIONAL
Brand: ACUMED

## 2020-09-29 RX ORDER — MAGNESIUM HYDROXIDE 1200 MG/15ML
LIQUID ORAL AS NEEDED
Status: DISCONTINUED | OUTPATIENT
Start: 2020-09-29 | End: 2020-09-29 | Stop reason: HOSPADM

## 2020-09-29 RX ORDER — LIDOCAINE HYDROCHLORIDE 10 MG/ML
INJECTION, SOLUTION EPIDURAL; INFILTRATION; INTRACAUDAL; PERINEURAL
Status: COMPLETED | OUTPATIENT
Start: 2020-09-29 | End: 2020-09-29

## 2020-09-29 RX ORDER — SODIUM CHLORIDE, SODIUM LACTATE, POTASSIUM CHLORIDE, CALCIUM CHLORIDE 600; 310; 30; 20 MG/100ML; MG/100ML; MG/100ML; MG/100ML
100 INJECTION, SOLUTION INTRAVENOUS CONTINUOUS
Status: DISCONTINUED | OUTPATIENT
Start: 2020-09-29 | End: 2020-09-29 | Stop reason: HOSPADM

## 2020-09-29 RX ORDER — PROPOFOL 10 MG/ML
INJECTION, EMULSION INTRAVENOUS CONTINUOUS PRN
Status: DISCONTINUED | OUTPATIENT
Start: 2020-09-29 | End: 2020-09-29

## 2020-09-29 RX ORDER — ONDANSETRON 2 MG/ML
4 INJECTION INTRAMUSCULAR; INTRAVENOUS ONCE AS NEEDED
Status: DISCONTINUED | OUTPATIENT
Start: 2020-09-29 | End: 2020-09-29 | Stop reason: HOSPADM

## 2020-09-29 RX ORDER — MIDAZOLAM HYDROCHLORIDE 2 MG/2ML
INJECTION, SOLUTION INTRAMUSCULAR; INTRAVENOUS AS NEEDED
Status: DISCONTINUED | OUTPATIENT
Start: 2020-09-29 | End: 2020-09-29

## 2020-09-29 RX ORDER — FENTANYL CITRATE 50 UG/ML
INJECTION, SOLUTION INTRAMUSCULAR; INTRAVENOUS AS NEEDED
Status: DISCONTINUED | OUTPATIENT
Start: 2020-09-29 | End: 2020-09-29

## 2020-09-29 RX ORDER — FENTANYL CITRATE/PF 50 MCG/ML
25 SYRINGE (ML) INJECTION
Status: DISCONTINUED | OUTPATIENT
Start: 2020-09-29 | End: 2020-09-29 | Stop reason: HOSPADM

## 2020-09-29 RX ORDER — PROPOFOL 10 MG/ML
INJECTION, EMULSION INTRAVENOUS AS NEEDED
Status: DISCONTINUED | OUTPATIENT
Start: 2020-09-29 | End: 2020-09-29

## 2020-09-29 RX ORDER — ACETAMINOPHEN 325 MG/1
650 TABLET ORAL EVERY 6 HOURS PRN
Status: CANCELLED | OUTPATIENT
Start: 2020-09-29

## 2020-09-29 RX ORDER — TRAMADOL HYDROCHLORIDE 50 MG/1
50 TABLET ORAL EVERY 6 HOURS PRN
Status: CANCELLED | OUTPATIENT
Start: 2020-09-29

## 2020-09-29 RX ORDER — ROPIVACAINE HYDROCHLORIDE 5 MG/ML
INJECTION, SOLUTION EPIDURAL; INFILTRATION; PERINEURAL
Status: COMPLETED | OUTPATIENT
Start: 2020-09-29 | End: 2020-09-29

## 2020-09-29 RX ORDER — ONDANSETRON 2 MG/ML
4 INJECTION INTRAMUSCULAR; INTRAVENOUS EVERY 6 HOURS PRN
Status: CANCELLED | OUTPATIENT
Start: 2020-09-29

## 2020-09-29 RX ORDER — SODIUM CHLORIDE, SODIUM LACTATE, POTASSIUM CHLORIDE, CALCIUM CHLORIDE 600; 310; 30; 20 MG/100ML; MG/100ML; MG/100ML; MG/100ML
INJECTION, SOLUTION INTRAVENOUS CONTINUOUS PRN
Status: DISCONTINUED | OUTPATIENT
Start: 2020-09-29 | End: 2020-09-29

## 2020-09-29 RX ORDER — CLINDAMYCIN PHOSPHATE 900 MG/50ML
900 INJECTION INTRAVENOUS ONCE
Status: COMPLETED | OUTPATIENT
Start: 2020-09-29 | End: 2020-09-29

## 2020-09-29 RX ADMIN — FENTANYL CITRATE 100 MCG: 50 INJECTION, SOLUTION INTRAMUSCULAR; INTRAVENOUS at 12:25

## 2020-09-29 RX ADMIN — ROPIVACAINE HYDROCHLORIDE 30 ML: 5 INJECTION, SOLUTION EPIDURAL; INFILTRATION; PERINEURAL at 12:30

## 2020-09-29 RX ADMIN — PROPOFOL 50 MG: 10 INJECTION, EMULSION INTRAVENOUS at 14:07

## 2020-09-29 RX ADMIN — CLINDAMYCIN PHOSPHATE 900 MG: 18 INJECTION, SOLUTION INTRAMUSCULAR; INTRAVENOUS at 13:55

## 2020-09-29 RX ADMIN — PROPOFOL 100 MCG/KG/MIN: 10 INJECTION, EMULSION INTRAVENOUS at 14:07

## 2020-09-29 RX ADMIN — MIDAZOLAM HYDROCHLORIDE 2 MG: 1 INJECTION, SOLUTION INTRAMUSCULAR; INTRAVENOUS at 12:25

## 2020-09-29 RX ADMIN — SODIUM CHLORIDE, SODIUM LACTATE, POTASSIUM CHLORIDE, AND CALCIUM CHLORIDE: .6; .31; .03; .02 INJECTION, SOLUTION INTRAVENOUS at 12:05

## 2020-09-29 RX ADMIN — SODIUM CHLORIDE, SODIUM LACTATE, POTASSIUM CHLORIDE, AND CALCIUM CHLORIDE: .6; .31; .03; .02 INJECTION, SOLUTION INTRAVENOUS at 14:15

## 2020-09-29 RX ADMIN — LIDOCAINE HYDROCHLORIDE 3 ML: 10 INJECTION, SOLUTION EPIDURAL; INFILTRATION; INTRACAUDAL; PERINEURAL at 12:30

## 2020-09-29 RX ADMIN — PROPOFOL 50 MG: 10 INJECTION, EMULSION INTRAVENOUS at 14:10

## 2020-09-29 NOTE — OP NOTE
OPERATIVE REPORT    PATIENT NAME: Tram Johnson    MEDICAL RECORD NO:  [id number]    PROCEDURE DATE:  20    :  1975    SURGEON:  Charle Hamman D Stafford Bolls, M D , Ph D     Victoriano Finley:  Sheryl Hernandez PA-C    No qualified resident was available to assist for this surgery  A Physician Assistant was used to aid in reduction and retraction while the orthopedic hardware was placed  PREOPERATIVE DIAGNOSIS:    left distal radius 2 part intra-articular fracture    POSTOPERATIVE DIAGNOSIS:   left distal radius 2 part intra-articular fracture    PROCEDURE PERFORMED:    Open reduction internal fixation of left distal radius    ANESTHESIA:  Regional and conscious sedation    COMPLICATIONS: none    TOURNIQUET TIME:  28 minutes at 250 mmHg     EQUIPMENT USED:  wide Aculoc plating system     DISPOSITION: Patient was sent to the PACU in stable condition  INDICATIONS: Patient is a 38 y o  male who suffered a left distal radius intra-articular fracture as determined by imaging studies  Surgical intervention was offered and the risks and benefits of open reduction and internal fixation were explained  Consent was obtained for surgical intervention  PROCEDURE:  The patient was identified in the preoperative screening area  Consent was signed and verified after identifying the correct operative site  The patient was taken back to the operating room after receiving axillary block in the pre-op holding area  Regional and conscious sedation was provided  The patients left upper extremity was prepped and draped in normal sterile fashion with chlorhexidine solution  The arm was then elevated and exsanguinated with an Esmarch and tourniquet placed about the brachium was insufflated to 250 mmHg  A volar longitudinal incision was made over the FCR tendon approximately 6-8cm in length ending just proximal to the wrist flexion crease    Subcutaneous tissue was dissected sharply and superficial vessels were cauterized or preserved where possible  The superficial and deep portions of the FCR sheath were incised and the FCR tendon was reflected ulnarly along with the deeper FPL tendon  The pronator quadratus was identified and released off its radial attachment  The fracture site was identified and was irrigated and cleaned of debris  The fracture was then provisionally reduced and held with K-wires  The articular surface was reconstructed first and secured with provisionally placed K-wires  The reconstructed articular surface was then reduced to the proximal shaft and secured with an 0 62 K-wire placed from the radial styloid into the proximal shaft  Once provisional reduction was achieved, the wide Aculoc plate was placed over the fracture site and provisionally stabilized with 0 054 K-wires  Intra-operative fluoroscopic imaging demonstrated good alignment of the fracture fragments and good positioning of the plate  The distal portion of the plate was then secured first  The distal row was filled with 2 4 mm locking pegs and screws of appropriate length  The two styloid holes of the plate were filled with 2 4 mm locking pegs of appropriate length  The temporary K-wires were then removed  Final manipulation of the fracture was then performed prior to securing the plate to the proximal fragment  The plate was secured to the proximal fragment using one 3 5 mm bicortical non-locking screw placed in compression  Two 3 5 mm bicortical locking screws were then placed to lock the final construct  Screws of appropriate length were placed  Fracture stabilization was assessed and found to be stable and secure  Final intra-operative fluoroscopic images were obtained demonstrating good reduction of the fracture and articular surface and good placement of the hardware  The wound was irrigated with copious amounts of saline solution    The pronator quadratus was then repaired back to the radial margin with 3-0 Ethibond sutures placed in a figure-8 fashion  Good soft tissue interposition between the plate and flexor tendons was achieved  The tendons were riding over soft tissue and not in direct contact with the plate  The Tourniquet was released at approximately 28 minutes with good capillary refill and color in the digits  Hemostasis was achieved  The skin was then closed with 4-0 nylon suture in a horizontal mattress fashion  The wound was dressed in a sterile dressing and the wrist was immobilized in a volar wrist splint  The patient tolerated the procedure well, was awakened in the operating room, and sent to the PACU in stable condition  As no first assistant was provided by the hospital, it was elected to use a physician's assistant to stabilize the extremity and aid in instrumentation       I was present for the entire procedure     Patient Disposition:  PACU      SIGNATURE: Monica Ron MD/PhD  DATE: 09/29/20  TIME:  2:44 PM

## 2020-09-29 NOTE — ANESTHESIA POSTPROCEDURE EVALUATION
Post-Op Assessment Note    CV Status:  Stable    Pain management: adequate     Mental Status:  Alert and awake   Hydration Status:  Euvolemic   PONV Controlled:  Controlled   Airway Patency:  Patent      Post Op Vitals Reviewed: Yes      Staff: CRNA         No complications documented      BP   101/60   Temp   98 0   Pulse  78   Resp   12   SpO2   99

## 2020-09-29 NOTE — INTERVAL H&P NOTE
H&P reviewed  After examining the patient I find no changes in the patients condition since the H&P had been written      Vitals:    09/29/20 1201   BP: 122/71   Pulse: 82   Resp: 18   Temp: 97 8 °F (36 6 °C)   SpO2: 97%

## 2020-09-29 NOTE — ANESTHESIA PROCEDURE NOTES
Peripheral Block    Patient location during procedure: holding area  Start time: 9/29/2020 12:30 PM  Reason for block: at surgeon's request and post-op pain management  Staffing  Anesthesiologist: Linda Llamas MD  Performed: anesthesiologist   Preanesthetic Checklist  Completed: patient identified, site marked, surgical consent, pre-op evaluation, timeout performed, IV checked, risks and benefits discussed and monitors and equipment checked  Peripheral Block  Patient position: sitting  Prep: ChloraPrep  Patient monitoring: continuous pulse ox, frequent blood pressure checks and cardiac monitor  Block type: supraclavicular  Laterality: left  Injection technique: single-shot  Procedures: ultrasound guided, Ultrasound guidance required for the procedure to increase accuracy and safety of medication placement and decrease risk of complications  Ultrasound permanent image savedlidocaine (PF) (XYLOCAINE-MPF) 1 % infiltration, 3 mL  ropivacaine (NAROPIN) 0 5 % perineural infiltration, 30 mL  Needle  Needle type: "[employer]"   Needle gauge: 20 ga    Needle length: 10 cm  Needle localization: ultrasound guidance  Test dose: negative  Assessment  Injection assessment: local visualized surrounding nerve on ultrasound, negative aspiration for heme and no paresthesia on injection  Paresthesia pain: none  Heart rate change: no  Slow fractionated injection: yes  Post-procedure:  site cleaned  patient tolerated the procedure well with no immediate complications  Additional Notes  Ropivacaine 0 5% 30 mL with 5 mg decadron given

## 2020-09-29 NOTE — DISCHARGE INSTRUCTIONS
Post Operative Instructions    You have had surgery on your arm today, please read and follow the information below:  · Elevate your hand above your elbow during the next 24-48 hours to help with swelling  · Place your hand and arm over your head with motion at your shoulder three times a day  · Do not apply any cream/ointment/oil to your incisions including antibiotics  · Do not soak your hands in standing water (dishwater, tubs, Jacuzzi's, pools, etc ) until given permission (typically 2-3 weeks after injury)    Call the office at [phone]  if you notice any:  · Increased numbness or tingling of your hand or fingers that is not relieved with elevation  · Increasing pain that is not controlled with medication  · Difficulty chewing, breathing, swallowing  · Chest pains or shortness of breath  · Fever over 101 4 degrees  Bandage: Your therapist will remove your bandage at your first therapy appointment  Motion: Move fingers into a fist 5 times a day, DO NOT move any splinted fingers  Weight bearing status: The operated extremity should be non-weight bearing until further notice  Ice: Ice for 10 minutes every hour as needed for swelling x 24 hours  Sling: Sling for comfort for 2-3 days, or until pain block wears off  Pain medication: A prescription for pain medication was provided in the office and sent to your pharmacy  Your prescription pain medication also contains Tylenol  Please limit total Tylenol dose to under 3,000 mg a day  After surgery, we would like you to take Ibuprofen 600mg one tablet by mouth every 6 hours with food (at breakfast, lunch and dinner)  AND Tylenol 500 mg one tablet by mouth every 6 hours  (at breakfast, lunch and dinner) for 5-7 days after your surgery  Please take these medication EVERYDAY after surgery for 5-7 days, and not just as needed  You can take these medications at the same time    Taking these medications after surgery will limit your need for prescription pain medication  If the pain becomes severe, and the pain medication is not alleviating symptoms, The greatest source of pain after surgery is usually a tight dressing due to increased swelling after surgery  If the pain becomes severe after surgery, and the patient medication is NOT alleviating the symptoms, the patient should do the following: The patient should  loosen the top dressing (usually coban or an ace bandage) and loosen/cut the rolled gauze beneath  The 4x4 gauze that is directly covering the incision should remain in place  The splint (if the patient has one) should remain in place  The ace bandage/coban can then be replaced on top in a less constrictive manor  If this does not help relieve the pain/numbness in a few hours, the patient should call our office (number listed below)  and we can have them seen in the office for further evaluation  Follow-up Appointment: 7-10 days with Dr Adrianna Saleh  Occupational Therapy: 10/1/2020  [address] [place] [address], the patient may remove the splint/dressing for showering and clean the incision with soap and water  Keep incision dry after washing  Do not expose the incision to dirty water (oceans, pools, hot tubs, etc)     If you need help scheduling Therapy, you can call [phone]        Please call the office at [phone] if you have any questions or concerns regarding your post-operative care

## 2020-10-01 ENCOUNTER — EVALUATION (OUTPATIENT)
Dept: OCCUPATIONAL THERAPY | Facility: CLINIC | Age: 45
End: 2020-10-01
Payer: OTHER MISCELLANEOUS

## 2020-10-01 ENCOUNTER — APPOINTMENT (OUTPATIENT)
Dept: URGENT CARE | Age: 45
End: 2020-10-01
Payer: OTHER MISCELLANEOUS

## 2020-10-01 DIAGNOSIS — S52.502D CLOSED FRACTURE DISTAL RADIUS AND ULNA, LEFT, WITH ROUTINE HEALING, SUBSEQUENT ENCOUNTER: ICD-10-CM

## 2020-10-01 DIAGNOSIS — S52.602D CLOSED FRACTURE DISTAL RADIUS AND ULNA, LEFT, WITH ROUTINE HEALING, SUBSEQUENT ENCOUNTER: ICD-10-CM

## 2020-10-01 PROCEDURE — L3906 WHO W/O JOINTS CF: HCPCS

## 2020-10-01 PROCEDURE — 99213 OFFICE O/P EST LOW 20 MIN: CPT | Performed by: NURSE PRACTITIONER

## 2020-10-01 PROCEDURE — 97165 OT EVAL LOW COMPLEX 30 MIN: CPT

## 2020-10-01 PROCEDURE — 97110 THERAPEUTIC EXERCISES: CPT

## 2020-10-06 ENCOUNTER — OFFICE VISIT (OUTPATIENT)
Dept: OCCUPATIONAL THERAPY | Facility: CLINIC | Age: 45
End: 2020-10-06
Payer: OTHER MISCELLANEOUS

## 2020-10-06 DIAGNOSIS — S52.502D CLOSED FRACTURE DISTAL RADIUS AND ULNA, LEFT, WITH ROUTINE HEALING, SUBSEQUENT ENCOUNTER: Primary | ICD-10-CM

## 2020-10-06 DIAGNOSIS — S52.602D CLOSED FRACTURE DISTAL RADIUS AND ULNA, LEFT, WITH ROUTINE HEALING, SUBSEQUENT ENCOUNTER: Primary | ICD-10-CM

## 2020-10-06 PROCEDURE — 97110 THERAPEUTIC EXERCISES: CPT

## 2020-10-08 ENCOUNTER — OFFICE VISIT (OUTPATIENT)
Dept: OCCUPATIONAL THERAPY | Facility: CLINIC | Age: 45
End: 2020-10-08
Payer: OTHER MISCELLANEOUS

## 2020-10-08 DIAGNOSIS — S52.502D CLOSED FRACTURE DISTAL RADIUS AND ULNA, LEFT, WITH ROUTINE HEALING, SUBSEQUENT ENCOUNTER: Primary | ICD-10-CM

## 2020-10-08 DIAGNOSIS — S52.602D CLOSED FRACTURE DISTAL RADIUS AND ULNA, LEFT, WITH ROUTINE HEALING, SUBSEQUENT ENCOUNTER: Primary | ICD-10-CM

## 2020-10-08 PROCEDURE — 97140 MANUAL THERAPY 1/> REGIONS: CPT

## 2020-10-08 PROCEDURE — 97110 THERAPEUTIC EXERCISES: CPT

## 2020-10-13 ENCOUNTER — OFFICE VISIT (OUTPATIENT)
Dept: OCCUPATIONAL THERAPY | Facility: CLINIC | Age: 45
End: 2020-10-13
Payer: OTHER MISCELLANEOUS

## 2020-10-13 DIAGNOSIS — S52.602D CLOSED FRACTURE DISTAL RADIUS AND ULNA, LEFT, WITH ROUTINE HEALING, SUBSEQUENT ENCOUNTER: Primary | ICD-10-CM

## 2020-10-13 DIAGNOSIS — S52.502D CLOSED FRACTURE DISTAL RADIUS AND ULNA, LEFT, WITH ROUTINE HEALING, SUBSEQUENT ENCOUNTER: Primary | ICD-10-CM

## 2020-10-13 PROCEDURE — 97140 MANUAL THERAPY 1/> REGIONS: CPT

## 2020-10-13 PROCEDURE — 97530 THERAPEUTIC ACTIVITIES: CPT

## 2020-10-13 PROCEDURE — 97110 THERAPEUTIC EXERCISES: CPT

## 2020-10-15 ENCOUNTER — APPOINTMENT (OUTPATIENT)
Dept: URGENT CARE | Age: 45
End: 2020-10-15
Payer: OTHER MISCELLANEOUS

## 2020-10-15 ENCOUNTER — OFFICE VISIT (OUTPATIENT)
Dept: OCCUPATIONAL THERAPY | Facility: CLINIC | Age: 45
End: 2020-10-15
Payer: OTHER MISCELLANEOUS

## 2020-10-15 DIAGNOSIS — S52.502D CLOSED FRACTURE DISTAL RADIUS AND ULNA, LEFT, WITH ROUTINE HEALING, SUBSEQUENT ENCOUNTER: Primary | ICD-10-CM

## 2020-10-15 DIAGNOSIS — S52.602D CLOSED FRACTURE DISTAL RADIUS AND ULNA, LEFT, WITH ROUTINE HEALING, SUBSEQUENT ENCOUNTER: Primary | ICD-10-CM

## 2020-10-15 PROCEDURE — 97110 THERAPEUTIC EXERCISES: CPT

## 2020-10-15 PROCEDURE — 99213 OFFICE O/P EST LOW 20 MIN: CPT | Performed by: PHYSICIAN ASSISTANT

## 2020-10-15 PROCEDURE — 97140 MANUAL THERAPY 1/> REGIONS: CPT

## 2020-10-15 PROCEDURE — 97530 THERAPEUTIC ACTIVITIES: CPT

## 2020-10-16 ENCOUNTER — OFFICE VISIT (OUTPATIENT)
Dept: OBGYN CLINIC | Facility: CLINIC | Age: 45
End: 2020-10-16

## 2020-10-16 ENCOUNTER — APPOINTMENT (OUTPATIENT)
Dept: RADIOLOGY | Facility: CLINIC | Age: 45
End: 2020-10-16
Payer: OTHER MISCELLANEOUS

## 2020-10-16 VITALS
TEMPERATURE: 97.6 F | SYSTOLIC BLOOD PRESSURE: 120 MMHG | BODY MASS INDEX: 23.95 KG/M2 | WEIGHT: 158 LBS | HEART RATE: 82 BPM | DIASTOLIC BLOOD PRESSURE: 75 MMHG | HEIGHT: 68 IN

## 2020-10-16 DIAGNOSIS — Z98.890 S/P ORIF (OPEN REDUCTION INTERNAL FIXATION) FRACTURE: Primary | ICD-10-CM

## 2020-10-16 DIAGNOSIS — Z87.81 S/P ORIF (OPEN REDUCTION INTERNAL FIXATION) FRACTURE: ICD-10-CM

## 2020-10-16 DIAGNOSIS — Z98.890 S/P ORIF (OPEN REDUCTION INTERNAL FIXATION) FRACTURE: ICD-10-CM

## 2020-10-16 DIAGNOSIS — Z87.81 S/P ORIF (OPEN REDUCTION INTERNAL FIXATION) FRACTURE: Primary | ICD-10-CM

## 2020-10-16 PROCEDURE — 73110 X-RAY EXAM OF WRIST: CPT

## 2020-10-16 PROCEDURE — 99024 POSTOP FOLLOW-UP VISIT: CPT | Performed by: ORTHOPAEDIC SURGERY

## 2020-10-20 ENCOUNTER — OFFICE VISIT (OUTPATIENT)
Dept: OCCUPATIONAL THERAPY | Facility: CLINIC | Age: 45
End: 2020-10-20
Payer: OTHER MISCELLANEOUS

## 2020-10-20 DIAGNOSIS — S52.502D CLOSED FRACTURE DISTAL RADIUS AND ULNA, LEFT, WITH ROUTINE HEALING, SUBSEQUENT ENCOUNTER: Primary | ICD-10-CM

## 2020-10-20 DIAGNOSIS — S52.602D CLOSED FRACTURE DISTAL RADIUS AND ULNA, LEFT, WITH ROUTINE HEALING, SUBSEQUENT ENCOUNTER: Primary | ICD-10-CM

## 2020-10-20 PROCEDURE — 97140 MANUAL THERAPY 1/> REGIONS: CPT

## 2020-10-20 PROCEDURE — 97110 THERAPEUTIC EXERCISES: CPT

## 2020-10-20 PROCEDURE — 97530 THERAPEUTIC ACTIVITIES: CPT

## 2020-10-22 ENCOUNTER — OFFICE VISIT (OUTPATIENT)
Dept: OCCUPATIONAL THERAPY | Facility: CLINIC | Age: 45
End: 2020-10-22
Payer: OTHER MISCELLANEOUS

## 2020-10-22 DIAGNOSIS — S52.602D CLOSED FRACTURE DISTAL RADIUS AND ULNA, LEFT, WITH ROUTINE HEALING, SUBSEQUENT ENCOUNTER: Primary | ICD-10-CM

## 2020-10-22 DIAGNOSIS — S52.502D CLOSED FRACTURE DISTAL RADIUS AND ULNA, LEFT, WITH ROUTINE HEALING, SUBSEQUENT ENCOUNTER: Primary | ICD-10-CM

## 2020-10-22 PROCEDURE — 97530 THERAPEUTIC ACTIVITIES: CPT

## 2020-10-22 PROCEDURE — 97110 THERAPEUTIC EXERCISES: CPT

## 2020-10-22 PROCEDURE — 97140 MANUAL THERAPY 1/> REGIONS: CPT

## 2020-10-27 ENCOUNTER — OFFICE VISIT (OUTPATIENT)
Dept: OCCUPATIONAL THERAPY | Facility: CLINIC | Age: 45
End: 2020-10-27
Payer: OTHER MISCELLANEOUS

## 2020-10-27 DIAGNOSIS — S52.602D CLOSED FRACTURE DISTAL RADIUS AND ULNA, LEFT, WITH ROUTINE HEALING, SUBSEQUENT ENCOUNTER: Primary | ICD-10-CM

## 2020-10-27 DIAGNOSIS — S52.502D CLOSED FRACTURE DISTAL RADIUS AND ULNA, LEFT, WITH ROUTINE HEALING, SUBSEQUENT ENCOUNTER: Primary | ICD-10-CM

## 2020-10-27 PROCEDURE — 97010 HOT OR COLD PACKS THERAPY: CPT

## 2020-10-27 PROCEDURE — 97110 THERAPEUTIC EXERCISES: CPT

## 2020-10-27 PROCEDURE — 97140 MANUAL THERAPY 1/> REGIONS: CPT

## 2020-10-29 ENCOUNTER — EVALUATION (OUTPATIENT)
Dept: OCCUPATIONAL THERAPY | Facility: CLINIC | Age: 45
End: 2020-10-29
Payer: OTHER MISCELLANEOUS

## 2020-10-29 DIAGNOSIS — S52.502D CLOSED FRACTURE DISTAL RADIUS AND ULNA, LEFT, WITH ROUTINE HEALING, SUBSEQUENT ENCOUNTER: Primary | ICD-10-CM

## 2020-10-29 DIAGNOSIS — S52.602D CLOSED FRACTURE DISTAL RADIUS AND ULNA, LEFT, WITH ROUTINE HEALING, SUBSEQUENT ENCOUNTER: Primary | ICD-10-CM

## 2020-10-29 PROCEDURE — 97140 MANUAL THERAPY 1/> REGIONS: CPT

## 2020-10-29 PROCEDURE — 97110 THERAPEUTIC EXERCISES: CPT

## 2020-11-03 ENCOUNTER — OFFICE VISIT (OUTPATIENT)
Dept: OCCUPATIONAL THERAPY | Facility: CLINIC | Age: 45
End: 2020-11-03
Payer: OTHER MISCELLANEOUS

## 2020-11-03 ENCOUNTER — IMMUNIZATIONS (OUTPATIENT)
Dept: FAMILY MEDICINE CLINIC | Facility: CLINIC | Age: 45
End: 2020-11-03
Payer: COMMERCIAL

## 2020-11-03 DIAGNOSIS — S52.602D CLOSED FRACTURE DISTAL RADIUS AND ULNA, LEFT, WITH ROUTINE HEALING, SUBSEQUENT ENCOUNTER: Primary | ICD-10-CM

## 2020-11-03 DIAGNOSIS — Z23 NEED FOR INFLUENZA VACCINATION: Primary | ICD-10-CM

## 2020-11-03 DIAGNOSIS — S52.502D CLOSED FRACTURE DISTAL RADIUS AND ULNA, LEFT, WITH ROUTINE HEALING, SUBSEQUENT ENCOUNTER: Primary | ICD-10-CM

## 2020-11-03 PROCEDURE — 90471 IMMUNIZATION ADMIN: CPT

## 2020-11-03 PROCEDURE — 97530 THERAPEUTIC ACTIVITIES: CPT

## 2020-11-03 PROCEDURE — 97140 MANUAL THERAPY 1/> REGIONS: CPT

## 2020-11-03 PROCEDURE — 97110 THERAPEUTIC EXERCISES: CPT

## 2020-11-03 PROCEDURE — 90686 IIV4 VACC NO PRSV 0.5 ML IM: CPT

## 2020-11-05 ENCOUNTER — APPOINTMENT (OUTPATIENT)
Dept: OCCUPATIONAL THERAPY | Facility: CLINIC | Age: 45
End: 2020-11-05
Payer: OTHER MISCELLANEOUS

## 2020-11-06 ENCOUNTER — TELEPHONE (OUTPATIENT)
Dept: OTHER | Facility: OTHER | Age: 45
End: 2020-11-06

## 2020-11-10 ENCOUNTER — OFFICE VISIT (OUTPATIENT)
Dept: OCCUPATIONAL THERAPY | Facility: CLINIC | Age: 45
End: 2020-11-10
Payer: OTHER MISCELLANEOUS

## 2020-11-10 DIAGNOSIS — S52.502D CLOSED FRACTURE DISTAL RADIUS AND ULNA, LEFT, WITH ROUTINE HEALING, SUBSEQUENT ENCOUNTER: Primary | ICD-10-CM

## 2020-11-10 DIAGNOSIS — S52.602D CLOSED FRACTURE DISTAL RADIUS AND ULNA, LEFT, WITH ROUTINE HEALING, SUBSEQUENT ENCOUNTER: Primary | ICD-10-CM

## 2020-11-10 PROCEDURE — 97110 THERAPEUTIC EXERCISES: CPT

## 2020-11-10 PROCEDURE — 97140 MANUAL THERAPY 1/> REGIONS: CPT

## 2020-11-10 PROCEDURE — 97530 THERAPEUTIC ACTIVITIES: CPT

## 2020-11-12 ENCOUNTER — APPOINTMENT (OUTPATIENT)
Dept: OCCUPATIONAL THERAPY | Facility: CLINIC | Age: 45
End: 2020-11-12
Payer: OTHER MISCELLANEOUS

## 2020-11-17 ENCOUNTER — APPOINTMENT (OUTPATIENT)
Dept: OCCUPATIONAL THERAPY | Facility: CLINIC | Age: 45
End: 2020-11-17
Payer: OTHER MISCELLANEOUS

## 2020-11-19 ENCOUNTER — APPOINTMENT (OUTPATIENT)
Dept: OCCUPATIONAL THERAPY | Facility: CLINIC | Age: 45
End: 2020-11-19
Payer: OTHER MISCELLANEOUS

## 2020-11-20 ENCOUNTER — APPOINTMENT (OUTPATIENT)
Dept: RADIOLOGY | Facility: CLINIC | Age: 45
End: 2020-11-20
Payer: OTHER MISCELLANEOUS

## 2020-11-20 ENCOUNTER — OFFICE VISIT (OUTPATIENT)
Dept: OBGYN CLINIC | Facility: CLINIC | Age: 45
End: 2020-11-20

## 2020-11-20 VITALS
HEIGHT: 68 IN | BODY MASS INDEX: 23.95 KG/M2 | TEMPERATURE: 98.2 F | SYSTOLIC BLOOD PRESSURE: 123 MMHG | HEART RATE: 83 BPM | WEIGHT: 158 LBS | DIASTOLIC BLOOD PRESSURE: 76 MMHG

## 2020-11-20 DIAGNOSIS — Z87.81 S/P ORIF (OPEN REDUCTION INTERNAL FIXATION) FRACTURE: ICD-10-CM

## 2020-11-20 DIAGNOSIS — Z98.890 S/P ORIF (OPEN REDUCTION INTERNAL FIXATION) FRACTURE: Primary | ICD-10-CM

## 2020-11-20 DIAGNOSIS — Z98.890 S/P ORIF (OPEN REDUCTION INTERNAL FIXATION) FRACTURE: ICD-10-CM

## 2020-11-20 DIAGNOSIS — Z87.81 S/P ORIF (OPEN REDUCTION INTERNAL FIXATION) FRACTURE: Primary | ICD-10-CM

## 2020-11-20 PROCEDURE — 73110 X-RAY EXAM OF WRIST: CPT

## 2020-11-20 PROCEDURE — 3008F BODY MASS INDEX DOCD: CPT | Performed by: FAMILY MEDICINE

## 2020-11-20 PROCEDURE — 99024 POSTOP FOLLOW-UP VISIT: CPT | Performed by: PHYSICIAN ASSISTANT

## 2020-11-23 ENCOUNTER — APPOINTMENT (OUTPATIENT)
Dept: OCCUPATIONAL THERAPY | Facility: CLINIC | Age: 45
End: 2020-11-23
Payer: OTHER MISCELLANEOUS

## 2020-11-24 PROBLEM — Z00.00 WELL ADULT EXAM: Status: RESOLVED | Noted: 2020-01-03 | Resolved: 2020-11-24

## 2020-11-25 ENCOUNTER — APPOINTMENT (OUTPATIENT)
Dept: OCCUPATIONAL THERAPY | Facility: CLINIC | Age: 45
End: 2020-11-25
Payer: OTHER MISCELLANEOUS

## 2020-11-25 ENCOUNTER — TELEMEDICINE (OUTPATIENT)
Dept: FAMILY MEDICINE CLINIC | Facility: CLINIC | Age: 45
End: 2020-11-25
Payer: COMMERCIAL

## 2020-11-25 DIAGNOSIS — J01.90 ACUTE NON-RECURRENT SINUSITIS, UNSPECIFIED LOCATION: Primary | ICD-10-CM

## 2020-11-25 PROCEDURE — 99213 OFFICE O/P EST LOW 20 MIN: CPT | Performed by: FAMILY MEDICINE

## 2020-11-25 PROCEDURE — 1036F TOBACCO NON-USER: CPT | Performed by: FAMILY MEDICINE

## 2020-11-25 RX ORDER — DOXYCYCLINE HYCLATE 100 MG/1
100 CAPSULE ORAL EVERY 12 HOURS SCHEDULED
Qty: 14 CAPSULE | Refills: 0 | Status: SHIPPED | OUTPATIENT
Start: 2020-11-25 | End: 2020-12-02

## 2020-12-03 ENCOUNTER — TELEPHONE (OUTPATIENT)
Dept: FAMILY MEDICINE CLINIC | Facility: CLINIC | Age: 45
End: 2020-12-03

## 2021-01-07 NOTE — PROGRESS NOTES
Idalia Quant 1975 male MRN: [id number]      ASSESSMENT/PLAN  Problem List Items Addressed This Visit     None      Visit Diagnoses     Healthcare maintenance    -  Primary    Screening for diabetes mellitus        Relevant Orders    Comprehensive metabolic panel    Screening, lipid        Relevant Orders    Lipid panel    Encounter for screening for infectious and parasitic diseases, unspecified        Relevant Orders    SARS-CoV2 Antibody, Total (IgG, IgA, IgM) [facility]- Lab Collect        BP WNL   BMI WNL   CMP + Lipids to screen for HLD, DM   Eye and Dental exams UTD     After discussion of below, pt elects to proceed  If you choose to have a COVID-19 antibody test, you should understand some important limitations of this test   Antibody tests detect the body's immune response to infection rather than detecting the virus itself  It can take weeks for antibodies to show up in the blood  For this reason, antibody tests are not used to detect or manage infection  They may be better for tracking infections in the community  Current antibody tests have not undergone the usual strict FDA approval process  The FDA decided to make it easier to have more tests available  The result is that these new tests may not be reliable  Since COVID-19 antibody testing is so new, we do not know how accurate it is  You can have a positive test and have not actually been infected  You also can have a negative test even though you might have been infected  There are other coronaviruses that are known to cause the common cold  Many people may have antibodies to these other viruses that could make the COVID-19 antibody test positive  More importantly, even if you test positive, this does not necessarily mean you are immune to the virus  Even so, your [facility] provider understands that you may still want a COVID-19 antibody test and can order this test for you    It is important that you review the results with your provider and decide together how to use them  No future appointments  SUBJECTIVE  CC: Physical Exam (Patient seen in office today for a yearly PE - w/labs)      HPI:  Rakan Pisano is a 54 y o  male who presents for annual physical  History reviewed and updated as below  No acute concerns  Review of Systems   Constitutional: Negative for unexpected weight change  HENT: Negative for congestion, ear pain, rhinorrhea and sore throat  Eyes: Negative for visual disturbance  Respiratory: Negative for cough and shortness of breath  Cardiovascular: Negative for chest pain, palpitations and leg swelling  Gastrointestinal: Negative for abdominal pain, constipation and diarrhea  Endocrine: Negative for polyuria  Genitourinary: Negative for dysuria  Neurological: Negative for dizziness and light-headedness  Psychiatric/Behavioral: Negative for sleep disturbance  Historical Information   The patient history was reviewed and updated as follows:    Past Medical History:   Diagnosis Date    Allergic Youth    Dogs, cats    Asthma Youth    When i was younger  I outgrew it     Past Surgical History:   Procedure Laterality Date    [place] OPEN TX RADIAL & ULNAR SHAFT FX FIX RADIUS OR ULNA Left 9/29/2020    Procedure: OPEN REDUCTION W/ INTERNAL FIXATION (ORIF) RADIUS / ULNA (WRIST);   Surgeon: Juliet Vernon MD;  [facility]  Service: Orthopedics    TONSILLECTOMY       Family History   Problem Relation Age of Onset    Cancer Father     Prostate cancer Father     Cancer Mother     Breast cancer Mother       Social History   Social History     Substance and Sexual Activity   Alcohol Use Not Currently    Comment: Recovering alcoholic     Social History     Substance and Sexual Activity   Drug Use Never     Social History     Tobacco Use   Smoking Status Former Smoker    Packs/day: 1 00    Years: 5 00    Pack years: 5 00    Types: Cigarettes    Quit date: 1/1/2005    Years since quittin 0   Smokeless Tobacco Never Used       Medications:   No current outpatient medications on file  Allergies   Allergen Reactions    Penicillins Other (See Comments)     Unknown, childhood allergy       OBJECTIVE    Vitals:   Vitals:    21 0849   BP: 124/80   BP Location: Left arm   Patient Position: Sitting   Cuff Size: Standard   Pulse: 84   Resp: 18   Temp: (!) 97 °F (36 1 °C)   SpO2: 98%   Weight: 72 1 kg (159 lb)   Height: 5' 8" (1 727 m)           Physical Exam  Vitals signs and nursing note reviewed  Constitutional:       General: He is not in acute distress  Appearance: Normal appearance  HENT:      Head: Normocephalic and atraumatic  Right Ear: Tympanic membrane and ear canal normal       Left Ear: Tympanic membrane and ear canal normal       Nose: Nose normal       Mouth/Throat:      Mouth: Mucous membranes are moist       Pharynx: No oropharyngeal exudate or posterior oropharyngeal erythema  Eyes:      Conjunctiva/sclera: Conjunctivae normal    Cardiovascular:      Rate and Rhythm: Normal rate and regular rhythm  Pulmonary:      Effort: Pulmonary effort is normal  No respiratory distress  Breath sounds: Normal breath sounds  Abdominal:      General: Bowel sounds are normal  There is no distension  Palpations: Abdomen is soft  Tenderness: There is no abdominal tenderness  Musculoskeletal:      Right lower leg: No edema  Left lower leg: No edema  Lymphadenopathy:      Cervical: No cervical adenopathy  Skin:     General: Skin is warm and dry  Neurological:      General: No focal deficit present  Mental Status: He is alert     Psychiatric:         Mood and Affect: Mood normal                     DO Krystal Bruno  [facility] [address] [facility]   2021  9:00 AM

## 2021-01-08 ENCOUNTER — APPOINTMENT (OUTPATIENT)
Dept: LAB | Facility: CLINIC | Age: 46
End: 2021-01-08
Payer: COMMERCIAL

## 2021-01-08 ENCOUNTER — OFFICE VISIT (OUTPATIENT)
Dept: FAMILY MEDICINE CLINIC | Facility: CLINIC | Age: 46
End: 2021-01-08
Payer: COMMERCIAL

## 2021-01-08 VITALS
RESPIRATION RATE: 18 BRPM | SYSTOLIC BLOOD PRESSURE: 124 MMHG | DIASTOLIC BLOOD PRESSURE: 80 MMHG | TEMPERATURE: 97 F | OXYGEN SATURATION: 98 % | HEIGHT: 68 IN | HEART RATE: 84 BPM | BODY MASS INDEX: 24.1 KG/M2 | WEIGHT: 159 LBS

## 2021-01-08 DIAGNOSIS — Z00.00 HEALTHCARE MAINTENANCE: Primary | ICD-10-CM

## 2021-01-08 DIAGNOSIS — Z11.9 ENCOUNTER FOR SCREENING FOR INFECTIOUS AND PARASITIC DISEASES, UNSPECIFIED: ICD-10-CM

## 2021-01-08 DIAGNOSIS — Z13.220 SCREENING, LIPID: ICD-10-CM

## 2021-01-08 DIAGNOSIS — Z13.1 SCREENING FOR DIABETES MELLITUS: ICD-10-CM

## 2021-01-08 LAB
ALBUMIN SERPL BCP-MCNC: 4 G/DL (ref 3.5–5)
ALP SERPL-CCNC: 69 U/L (ref 46–116)
ALT SERPL W P-5'-P-CCNC: 34 U/L (ref 12–78)
ANION GAP SERPL CALCULATED.3IONS-SCNC: 3 MMOL/L (ref 4–13)
AST SERPL W P-5'-P-CCNC: 13 U/L (ref 5–45)
BILIRUB SERPL-MCNC: 0.23 MG/DL (ref 0.2–1)
BUN SERPL-MCNC: 16 MG/DL (ref 5–25)
CALCIUM SERPL-MCNC: 9.2 MG/DL (ref 8.3–10.1)
CHLORIDE SERPL-SCNC: 109 MMOL/L (ref 100–108)
CHOLEST SERPL-MCNC: 232 MG/DL (ref 50–200)
CO2 SERPL-SCNC: 28 MMOL/L (ref 21–32)
CREAT SERPL-MCNC: 1 MG/DL (ref 0.6–1.3)
GFR SERPL CREATININE-BSD FRML MDRD: 90 ML/MIN/1.73SQ M
GLUCOSE P FAST SERPL-MCNC: 91 MG/DL (ref 65–99)
HDLC SERPL-MCNC: 60 MG/DL
LDLC SERPL CALC-MCNC: 157 MG/DL (ref 0–100)
NONHDLC SERPL-MCNC: 172 MG/DL
POTASSIUM SERPL-SCNC: 4.3 MMOL/L (ref 3.5–5.3)
PROT SERPL-MCNC: 6.8 G/DL (ref 6.4–8.2)
SARS-COV-2 IGG+IGM SERPL QL IA: REACTIVE
SODIUM SERPL-SCNC: 140 MMOL/L (ref 136–145)
TRIGL SERPL-MCNC: 74 MG/DL

## 2021-01-08 PROCEDURE — 80053 COMPREHEN METABOLIC PANEL: CPT

## 2021-01-08 PROCEDURE — 80061 LIPID PANEL: CPT

## 2021-01-08 PROCEDURE — 99396 PREV VISIT EST AGE 40-64: CPT | Performed by: FAMILY MEDICINE

## 2021-01-08 PROCEDURE — 3008F BODY MASS INDEX DOCD: CPT | Performed by: FAMILY MEDICINE

## 2021-01-08 PROCEDURE — 86769 SARS-COV-2 COVID-19 ANTIBODY: CPT

## 2021-01-08 PROCEDURE — 36415 COLL VENOUS BLD VENIPUNCTURE: CPT

## 2021-01-08 PROCEDURE — 3725F SCREEN DEPRESSION PERFORMED: CPT | Performed by: FAMILY MEDICINE

## 2021-01-08 PROCEDURE — 1036F TOBACCO NON-USER: CPT | Performed by: FAMILY MEDICINE

## 2021-01-09 LAB — SARS-COV-2 IGG SERPL QL IA: NORMAL

## 2021-04-14 ENCOUNTER — TELEMEDICINE (OUTPATIENT)
Dept: FAMILY MEDICINE CLINIC | Facility: CLINIC | Age: 46
End: 2021-04-14
Payer: COMMERCIAL

## 2021-04-14 VITALS
DIASTOLIC BLOOD PRESSURE: 75 MMHG | HEIGHT: 68 IN | TEMPERATURE: 98 F | WEIGHT: 153 LBS | OXYGEN SATURATION: 94 % | SYSTOLIC BLOOD PRESSURE: 120 MMHG | BODY MASS INDEX: 23.19 KG/M2

## 2021-04-14 DIAGNOSIS — J45.20 MILD INTERMITTENT ASTHMA WITHOUT COMPLICATION: Primary | ICD-10-CM

## 2021-04-14 PROCEDURE — 3008F BODY MASS INDEX DOCD: CPT | Performed by: FAMILY MEDICINE

## 2021-04-14 PROCEDURE — 99213 OFFICE O/P EST LOW 20 MIN: CPT | Performed by: FAMILY MEDICINE

## 2021-04-14 RX ORDER — ALBUTEROL SULFATE 90 UG/1
2 AEROSOL, METERED RESPIRATORY (INHALATION) EVERY 6 HOURS PRN
Qty: 1 INHALER | Refills: 5 | Status: SHIPPED | OUTPATIENT
Start: 2021-04-14 | End: 2022-02-03

## 2021-04-14 RX ORDER — CETIRIZINE HYDROCHLORIDE 10 MG/1
10 TABLET ORAL DAILY
COMMUNITY

## 2021-04-14 NOTE — PROGRESS NOTES
Virtual Regular Visit      Assessment/Plan:    Problem List Items Addressed This Visit        Respiratory    Asthma - Primary    Relevant Medications    albuterol (Ventolin HFA) 90 mcg/act inhaler        Trial of Ventolin PRN  If symptoms fail to improve or he is using the Ventolin regularly, should make an appointment in office  Reason for visit is   Chief Complaint   Patient presents with    Virtual Brief Visit    Asthma    Headache        Encounter provider Ventura Mckoy MD    Provider located at [address]  [address] [place] [phone]      Recent Visits  No visits were found meeting these conditions  Showing recent visits within past 7 days and meeting all other requirements     Today's Visits  Date Type Provider Dept   04/14/21 Arnoldo Lopez MD [facility]   Showing today's visits and meeting all other requirements     Future Appointments  No visits were found meeting these conditions  Showing future appointments within next 150 days and meeting all other requirements        The patient was identified by name and date of birth  Celsa Munguia was informed that this is a telemedicine visit and that the visit is being conducted through [address] [place] [address] and patient was informed that this is not a secure, HIPAA-compliant platform  He agrees to proceed     My office door was closed  No one else was in the room  He acknowledged consent and understanding of privacy and security of the video platform  The patient has agreed to participate and understands they can discontinue the visit at any time  Patient is aware this is a billable service  Subjective  Celsa Munguia is a 54 y o  male    Patient reports feeling a little tightness when he takes a deep breath  He has no shortness of breath  Mild chest tightness  He is not sure if he is wheezing or not  He says he has a history of asthma    Does not have an inhaler currently  O2 sat on his apple watch is 97%  He denies any fevers chills nausea vomiting diarrhea sore throat nasal congestion body aches  He is occasionally feeling lightheaded  He is around animals and has allergies  He is on Zyrtec for allergies  He received a COVID vaccine Adriano Crawford on 03/13/2021    Shortness of Breath  This is a new problem  The current episode started 1 to 4 weeks ago  The problem occurs daily  The problem has been waxing and waning  Pertinent negatives include no abdominal pain, chest pain, claudication, coryza, ear pain, fever, headaches, hemoptysis, leg pain, leg swelling, neck pain, orthopnea, PND, rash, rhinorrhea, sore throat, sputum production, swollen glands, syncope, vomiting or wheezing  The symptoms are aggravated by occupational exposure, animal exposure and pollens  Past Medical History:   Diagnosis Date    Allergic Youth    Dogs, cats    Asthma Youth    When i was younger  I outgrew it       Past Surgical History:   Procedure Laterality Date    [place] OPEN TX RADIAL & ULNAR SHAFT FX FIX RADIUS OR ULNA Left 9/29/2020    Procedure: OPEN REDUCTION W/ INTERNAL FIXATION (ORIF) RADIUS / ULNA (WRIST); Surgeon: Azam Randolph MD;  [facility];  Service: Orthopedics    TONSILLECTOMY         Current Outpatient Medications   Medication Sig Dispense Refill    cetirizine (ZyrTEC) 10 mg tablet Take 10 mg by mouth daily      albuterol (Ventolin HFA) 90 mcg/act inhaler Inhale 2 puffs every 6 (six) hours as needed for wheezing 1 Inhaler 5     No current facility-administered medications for this visit  Allergies   Allergen Reactions    Penicillins Other (See Comments)     Unknown, childhood allergy       Review of Systems   Constitutional: Negative for fever  HENT: Negative for ear pain, rhinorrhea and sore throat  Respiratory: Positive for chest tightness and shortness of breath  Negative for cough, hemoptysis, sputum production and wheezing  Cardiovascular: Negative for chest pain, orthopnea, claudication, leg swelling, syncope and PND  Gastrointestinal: Negative for abdominal pain and vomiting  Musculoskeletal: Negative for neck pain  Skin: Negative for rash  Neurological: Negative for headaches  Video Exam    Vitals:    04/14/21 1152   BP: 120/75   Temp: 98 °F (36 7 °C)   SpO2: 94%   Weight: 69 4 kg (153 lb)   Height: 5' 8" (1 727 m)       Physical Exam  Vitals signs and nursing note reviewed  Constitutional:       General: He is not in acute distress  Appearance: He is well-developed  He is not diaphoretic  HENT:      Head: Normocephalic and atraumatic  Pulmonary:      Effort: Pulmonary effort is normal  No respiratory distress  Neurological:      Mental Status: He is alert  Psychiatric:         Behavior: Behavior normal          Thought Content: Thought content normal          Judgment: Judgment normal           I spent 5 minutes directly with the patient during this visit      VIRTUAL VISIT DISCLAIMER    Lisa Fabian acknowledges that he has consented to an online visit or consultation  He understands that the online visit is based solely on information provided by him, and that, in the absence of a face-to-face physical evaluation by the physician, the diagnosis he receives is both limited and provisional in terms of accuracy and completeness  This is not intended to replace a full medical face-to-face evaluation by the physician  Lisa Fabian understands and accepts these terms

## 2021-05-04 ENCOUNTER — OFFICE VISIT (OUTPATIENT)
Dept: FAMILY MEDICINE CLINIC | Facility: CLINIC | Age: 46
End: 2021-05-04
Payer: COMMERCIAL

## 2021-05-04 ENCOUNTER — APPOINTMENT (OUTPATIENT)
Dept: RADIOLOGY | Facility: MEDICAL CENTER | Age: 46
End: 2021-05-04
Payer: COMMERCIAL

## 2021-05-04 VITALS
SYSTOLIC BLOOD PRESSURE: 114 MMHG | OXYGEN SATURATION: 98 % | TEMPERATURE: 98.4 F | WEIGHT: 157 LBS | HEIGHT: 68 IN | HEART RATE: 96 BPM | DIASTOLIC BLOOD PRESSURE: 80 MMHG | BODY MASS INDEX: 23.79 KG/M2

## 2021-05-04 DIAGNOSIS — J45.20 MILD INTERMITTENT ASTHMA WITHOUT COMPLICATION: Primary | ICD-10-CM

## 2021-05-04 DIAGNOSIS — J45.20 MILD INTERMITTENT ASTHMA WITHOUT COMPLICATION: ICD-10-CM

## 2021-05-04 PROCEDURE — 3008F BODY MASS INDEX DOCD: CPT | Performed by: PHYSICIAN ASSISTANT

## 2021-05-04 PROCEDURE — 71046 X-RAY EXAM CHEST 2 VIEWS: CPT

## 2021-05-04 PROCEDURE — 1036F TOBACCO NON-USER: CPT | Performed by: PHYSICIAN ASSISTANT

## 2021-05-04 PROCEDURE — 99213 OFFICE O/P EST LOW 20 MIN: CPT | Performed by: PHYSICIAN ASSISTANT

## 2021-05-04 RX ORDER — FLUTICASONE PROPIONATE 50 MCG
1 SPRAY, SUSPENSION (ML) NASAL DAILY
Qty: 16 G | Refills: 0 | Status: SHIPPED | OUTPATIENT
Start: 2021-05-04 | End: 2022-01-02

## 2021-05-04 NOTE — PROGRESS NOTES
Assessment/Plan:    No problem-specific Assessment & Plan notes found for this encounter  Problem List Items Addressed This Visit        Respiratory    Asthma - Primary    Relevant Medications    fluticasone (FLONASE) 50 mcg/act nasal spray    Other Relevant Orders    XR chest pa & lateral (Completed)          Continue zyrtec at night  flonase nasal spray as directed  Inhaler as needed  CXR ordered    Subjective:      Patient ID: Long logan a 54 y o  male  55 y/o male presents for evaluation of chest tightness and allergies  Patient is using his inhaler twice a day with some relief although states he still feels his allergies are triggered  He is taking zyrtec daily  Denies chest pain or palpations  Denies pain on deep inspiration  Denies fever or chills  The following portions of the patient's history were reviewed and updated as appropriate: allergies, current medications, past family history, past medical history, past social history, past surgical history and problem list     Review of Systems   Constitutional: Negative for chills, fatigue and fever  HENT: Negative for congestion, ear pain, postnasal drip, sinus pain, sore throat and trouble swallowing  Eyes: Negative for pain, discharge and redness  Respiratory: Positive for chest tightness  Negative for cough, shortness of breath and wheezing  Cardiovascular: Negative for chest pain, palpitations and leg swelling  Gastrointestinal: Negative for abdominal pain, diarrhea, nausea and vomiting  Musculoskeletal: Negative for arthralgias, joint swelling and myalgias  Skin: Negative for rash  Neurological: Negative for dizziness, weakness, numbness and headaches           Objective:      /80 (BP Location: Left arm, Patient Position: Sitting, Cuff Size: Standard)   Pulse 96   Temp 98 4 °F (36 9 °C)   Ht 5' 8" (1 727 m)   Wt 71 2 kg (157 lb)   SpO2 98%   BMI 23 87 kg/m²          Physical Exam  Vitals signs and nursing note reviewed  Constitutional:       General: He is not in acute distress  Appearance: Normal appearance  He is well-developed  Cardiovascular:      Rate and Rhythm: Normal rate and regular rhythm  Pulmonary:      Effort: Pulmonary effort is normal       Breath sounds: Normal breath sounds  No decreased breath sounds, wheezing, rhonchi or rales  Abdominal:      General: Bowel sounds are normal       Palpations: Abdomen is soft  Abdomen is not rigid  Tenderness: There is no abdominal tenderness  There is no guarding or rebound  Negative signs include Deutsch's sign and McBurney's sign  Hernia: No hernia is present  Skin:     General: Skin is warm and dry

## 2021-11-15 ENCOUNTER — IMMUNIZATIONS (OUTPATIENT)
Dept: FAMILY MEDICINE CLINIC | Facility: CLINIC | Age: 46
End: 2021-11-15
Payer: COMMERCIAL

## 2021-11-15 DIAGNOSIS — Z23 ENCOUNTER FOR IMMUNIZATION: Primary | ICD-10-CM

## 2021-11-15 PROCEDURE — 90471 IMMUNIZATION ADMIN: CPT | Performed by: FAMILY MEDICINE

## 2021-11-15 PROCEDURE — 90686 IIV4 VACC NO PRSV 0.5 ML IM: CPT | Performed by: FAMILY MEDICINE

## 2022-01-03 ENCOUNTER — TELEPHONE (OUTPATIENT)
Dept: FAMILY MEDICINE CLINIC | Facility: CLINIC | Age: 47
End: 2022-01-03

## 2022-01-03 NOTE — TELEPHONE ENCOUNTER
Sasha Bryan had a well visit that we rescheduled and he wonders if he could have labs ordered to get prior to his appt      Also, wanted to get a referral for an allergist - they were bad as a child and are real bad again and he is getting a lot of nose bleeds

## 2022-02-03 ENCOUNTER — OFFICE VISIT (OUTPATIENT)
Dept: FAMILY MEDICINE CLINIC | Facility: CLINIC | Age: 47
End: 2022-02-03
Payer: COMMERCIAL

## 2022-02-03 ENCOUNTER — APPOINTMENT (OUTPATIENT)
Dept: LAB | Facility: CLINIC | Age: 47
End: 2022-02-03
Payer: COMMERCIAL

## 2022-02-03 VITALS
BODY MASS INDEX: 22.88 KG/M2 | TEMPERATURE: 98 F | SYSTOLIC BLOOD PRESSURE: 110 MMHG | WEIGHT: 151 LBS | OXYGEN SATURATION: 97 % | DIASTOLIC BLOOD PRESSURE: 72 MMHG | HEART RATE: 95 BPM | HEIGHT: 68 IN

## 2022-02-03 DIAGNOSIS — J45.20 MILD INTERMITTENT ASTHMA WITHOUT COMPLICATION: ICD-10-CM

## 2022-02-03 DIAGNOSIS — Z13.1 SCREENING FOR DIABETES MELLITUS: ICD-10-CM

## 2022-02-03 DIAGNOSIS — Z13.220 SCREENING, LIPID: ICD-10-CM

## 2022-02-03 DIAGNOSIS — Z00.00 HEALTHCARE MAINTENANCE: Primary | ICD-10-CM

## 2022-02-03 DIAGNOSIS — Z23 NEED FOR TDAP VACCINATION: ICD-10-CM

## 2022-02-03 LAB
ALBUMIN SERPL BCP-MCNC: 4.1 G/DL (ref 3.5–5)
ALP SERPL-CCNC: 61 U/L (ref 46–116)
ALT SERPL W P-5'-P-CCNC: 30 U/L (ref 12–78)
ANION GAP SERPL CALCULATED.3IONS-SCNC: 5 MMOL/L (ref 4–13)
AST SERPL W P-5'-P-CCNC: 17 U/L (ref 5–45)
BASOPHILS # BLD AUTO: 0.03 THOUSANDS/ΜL (ref 0–0.1)
BASOPHILS NFR BLD AUTO: 1 % (ref 0–1)
BILIRUB SERPL-MCNC: 0.51 MG/DL (ref 0.2–1)
BUN SERPL-MCNC: 11 MG/DL (ref 5–25)
CALCIUM SERPL-MCNC: 9.2 MG/DL (ref 8.3–10.1)
CHLORIDE SERPL-SCNC: 106 MMOL/L (ref 100–108)
CHOLEST SERPL-MCNC: 247 MG/DL
CO2 SERPL-SCNC: 24 MMOL/L (ref 21–32)
CREAT SERPL-MCNC: 1 MG/DL (ref 0.6–1.3)
EOSINOPHIL # BLD AUTO: 0.26 THOUSAND/ΜL (ref 0–0.61)
EOSINOPHIL NFR BLD AUTO: 5 % (ref 0–6)
ERYTHROCYTE [DISTWIDTH] IN BLOOD BY AUTOMATED COUNT: 12 % (ref 11.6–15.1)
GFR SERPL CREATININE-BSD FRML MDRD: 89 ML/MIN/1.73SQ M
GLUCOSE P FAST SERPL-MCNC: 95 MG/DL (ref 65–99)
HCT VFR BLD AUTO: 44.5 % (ref 36.5–49.3)
HDLC SERPL-MCNC: 76 MG/DL
HGB BLD-MCNC: 15.1 G/DL (ref 12–17)
IMM GRANULOCYTES # BLD AUTO: 0.01 THOUSAND/UL (ref 0–0.2)
IMM GRANULOCYTES NFR BLD AUTO: 0 % (ref 0–2)
LDLC SERPL CALC-MCNC: 158 MG/DL (ref 0–100)
LYMPHOCYTES # BLD AUTO: 1.09 THOUSANDS/ΜL (ref 0.6–4.47)
LYMPHOCYTES NFR BLD AUTO: 23 % (ref 14–44)
MCH RBC QN AUTO: 30.4 PG (ref 26.8–34.3)
MCHC RBC AUTO-ENTMCNC: 33.9 G/DL (ref 31.4–37.4)
MCV RBC AUTO: 90 FL (ref 82–98)
MONOCYTES # BLD AUTO: 0.43 THOUSAND/ΜL (ref 0.17–1.22)
MONOCYTES NFR BLD AUTO: 9 % (ref 4–12)
NEUTROPHILS # BLD AUTO: 3.01 THOUSANDS/ΜL (ref 1.85–7.62)
NEUTS SEG NFR BLD AUTO: 62 % (ref 43–75)
NONHDLC SERPL-MCNC: 171 MG/DL
NRBC BLD AUTO-RTO: 0 /100 WBCS
PLATELET # BLD AUTO: 229 THOUSANDS/UL (ref 149–390)
PMV BLD AUTO: 10.1 FL (ref 8.9–12.7)
POTASSIUM SERPL-SCNC: 4.1 MMOL/L (ref 3.5–5.3)
PROT SERPL-MCNC: 7.3 G/DL (ref 6.4–8.2)
RBC # BLD AUTO: 4.96 MILLION/UL (ref 3.88–5.62)
SODIUM SERPL-SCNC: 135 MMOL/L (ref 136–145)
TRIGL SERPL-MCNC: 63 MG/DL
WBC # BLD AUTO: 4.83 THOUSAND/UL (ref 4.31–10.16)

## 2022-02-03 PROCEDURE — 3008F BODY MASS INDEX DOCD: CPT | Performed by: FAMILY MEDICINE

## 2022-02-03 PROCEDURE — 36415 COLL VENOUS BLD VENIPUNCTURE: CPT

## 2022-02-03 PROCEDURE — 80061 LIPID PANEL: CPT

## 2022-02-03 PROCEDURE — 99396 PREV VISIT EST AGE 40-64: CPT | Performed by: FAMILY MEDICINE

## 2022-02-03 PROCEDURE — 1036F TOBACCO NON-USER: CPT | Performed by: FAMILY MEDICINE

## 2022-02-03 PROCEDURE — 90715 TDAP VACCINE 7 YRS/> IM: CPT

## 2022-02-03 PROCEDURE — 85025 COMPLETE CBC W/AUTO DIFF WBC: CPT

## 2022-02-03 PROCEDURE — 90471 IMMUNIZATION ADMIN: CPT

## 2022-02-03 PROCEDURE — 80053 COMPREHEN METABOLIC PANEL: CPT

## 2022-02-03 NOTE — PROGRESS NOTES
Yousif Gauthier 1975 male MRN: [id number]      ASSESSMENT/PLAN  Problem List Items Addressed This Visit     None      Visit Diagnoses     Healthcare maintenance    -  Primary    Need for Tdap vaccination        Relevant Orders    TDAP VACCINE GREATER THAN OR EQUAL TO 6YO IM (Completed)        BP WNL   Update labs as previously ordered   Vaccinations: TDap given, Flu/COVID UTD  Encouraged regular physical activity, varied diet, and regular dental/eye exams       Future Appointments   Date Time Provider Simeon Kraft   3/21/2022 10:30 AM Tam Davidson DO [facility]          SUBJECTIVE  CC: Annual Exam      HPI:  Yousif Gauthier is a 51 y o  male who presents for annual physical  History reviewed and updated as below  No acute concerns  Review of Systems   Constitutional: Negative for unexpected weight change  HENT: Negative for congestion, ear pain, rhinorrhea and sore throat  Eyes: Negative for visual disturbance  Respiratory: Negative for cough and shortness of breath  Cardiovascular: Negative for chest pain, palpitations and leg swelling  Gastrointestinal: Negative for abdominal pain, constipation and diarrhea  Endocrine: Negative for polyuria  Genitourinary: Negative for dysuria  Neurological: Negative for dizziness and headaches  Psychiatric/Behavioral: Negative for sleep disturbance  Historical Information   The patient history was reviewed and updated as follows:    Past Medical History:   Diagnosis Date    Allergic Youth    Dogs, cats    Asthma Youth    When i was younger  I outgrew it    Mild intermittent asthma without complication     When i was younger  I outgrew it     Past Surgical History:   Procedure Laterality Date    [place] OPEN TX RADIAL & ULNAR SHAFT FX FIX RADIUS OR ULNA Left 9/29/2020    Procedure: OPEN REDUCTION W/ INTERNAL FIXATION (ORIF) RADIUS / ULNA (WRIST);   Surgeon: Frankie Terrell MD;  Location: [facility];  Service: Orthopedics    TONSILLECTOMY       Family History   Problem Relation Age of Onset    Cancer Father     Prostate cancer Father     Cancer Mother     Breast cancer Mother       Social History   Social History     Substance and Sexual Activity   Alcohol Use Not Currently    Comment: Recovering alcoholic     Social History     Substance and Sexual Activity   Drug Use Never     Social History     Tobacco Use   Smoking Status Former Smoker    Packs/day: 1 00    Years: 5 00    Pack years: 5 00    Types: Cigarettes    Quit date: 2005    Years since quittin 1   Smokeless Tobacco Never Used       Medications:     Current Outpatient Medications:     cetirizine (ZyrTEC) 10 mg tablet, Take 10 mg by mouth daily, Disp: , Rfl:   Allergies   Allergen Reactions    Penicillins Other (See Comments)     Unknown, childhood allergy       OBJECTIVE    Vitals:   Vitals:    22 0927   BP: 110/72   Pulse: 95   Temp: 98 °F (36 7 °C)   SpO2: 97%   Weight: 68 5 kg (151 lb)   Height: 5' 8" (1 727 m)           Physical Exam  Vitals and nursing note reviewed  Constitutional:       General: He is not in acute distress  Appearance: Normal appearance  HENT:      Head: Normocephalic and atraumatic  Right Ear: Tympanic membrane, ear canal and external ear normal       Left Ear: Tympanic membrane, ear canal and external ear normal       Nose: Nose normal       Mouth/Throat:      Mouth: Mucous membranes are moist       Pharynx: No oropharyngeal exudate or posterior oropharyngeal erythema  Eyes:      Conjunctiva/sclera: Conjunctivae normal    Cardiovascular:      Rate and Rhythm: Normal rate and regular rhythm  Pulmonary:      Effort: Pulmonary effort is normal  No respiratory distress  Breath sounds: Normal breath sounds  Abdominal:      General: Bowel sounds are normal  There is no distension  Palpations: Abdomen is soft  Tenderness: There is no abdominal tenderness     Musculoskeletal:      Right lower leg: No edema  Left lower leg: No edema  Lymphadenopathy:      Cervical: No cervical adenopathy  Skin:     General: Skin is warm and dry  Neurological:      General: No focal deficit present  Mental Status: He is alert     Psychiatric:         Mood and Affect: Mood normal                     DO Ty Mcfarland [address] [facility]   2/3/2022  9:42 AM

## 2022-03-21 PROBLEM — J30.89 ALLERGIC RHINITIS DUE TO HOUSE DUST MITE: Status: ACTIVE | Noted: 2022-03-21

## 2022-03-21 PROBLEM — J30.1 CHRONIC SEASONAL ALLERGIC RHINITIS DUE TO POLLEN: Status: ACTIVE | Noted: 2022-03-21

## 2022-03-21 PROBLEM — J30.81 ALLERGIC RHINITIS DUE TO ANIMAL (CAT) (DOG) HAIR AND DANDER: Status: ACTIVE | Noted: 2022-03-21

## 2022-08-03 ENCOUNTER — TELEPHONE (OUTPATIENT)
Dept: FAMILY MEDICINE CLINIC | Facility: CLINIC | Age: 47
End: 2022-08-03

## 2022-08-03 DIAGNOSIS — L98.9 SKIN LESION: Primary | ICD-10-CM

## 2023-01-30 ENCOUNTER — RA CDI HCC (OUTPATIENT)
Dept: OTHER | Facility: HOSPITAL | Age: 48
End: 2023-01-30

## 2023-01-30 NOTE — PROGRESS NOTES
[facility]  coding opportunities       Chart reviewed, no opportunity found: CHART REVIEWED, NO OPPORTUNITY FOUND      This is a reminder to address ALL HCC (risk adjustment) codes as found on active problem list for 2023 as patient scores reset to zero LEONARD    Patients Insurance        Commercial Insurance: [address] [place] [address]

## 2023-02-05 NOTE — PROGRESS NOTES
Milagro Anthony 1975 male MRN: [id number]      ASSESSMENT/PLAN  Problem List Items Addressed This Visit    None  Visit Diagnoses     Healthcare maintenance    -  Primary    Screening for colon cancer        Relevant Orders    Ambulatory referral for colonoscopy    Screening for diabetes mellitus        Relevant Orders    Comprehensive metabolic panel    Screening, lipid        Relevant Orders    Lipid panel    Encounter for hepatitis C screening test for low risk patient        Relevant Orders    Hepatitis C antibody        BP WNL   CMP + Lipids to screen for HLD, DM   Hep C screening ordered, pt agreeable   CRC DUE -- pt agreeable to colonoscopy   Vaccinations: TDap UTD, Flu UTD, COVID completed primary series + booster   Encouraged regular physical activity, varied diet, and regular dental/eye exams     No future appointments  SUBJECTIVE  CC: Physical Exam      HPI:  Milagro Anthony is a 49 y o  male who presents for annual physical  History reviewed and updated as below  Review of Systems   Constitutional: Negative for unexpected weight change  HENT: Negative for congestion, ear pain, rhinorrhea and sore throat  Eyes: Negative for visual disturbance  Respiratory: Negative for cough and shortness of breath  Cardiovascular: Negative for chest pain, palpitations and leg swelling  Gastrointestinal: Negative for abdominal pain, constipation and diarrhea  Endocrine: Negative for polyuria  Genitourinary: Negative for dysuria  Neurological: Negative for dizziness and headaches  Psychiatric/Behavioral: Negative for sleep disturbance  Historical Information   The patient history was reviewed and updated as follows:    Past Medical History:   Diagnosis Date   • Allergic Youth    Dogs, cats   • Asthma Youth    When i was younger  I outgrew it   • History of chickenpox    • Mild intermittent asthma without complication     When i was younger   I outgrew it   • Wears glasses      Past Surgical History:   Procedure Laterality Date   • CIRCUMCISION     • [place] OPEN TX RADIAL&ULNAR SHAFT FX W/FIXJ RADIUS/ULNA Left 2020    Procedure: OPEN REDUCTION W/ INTERNAL FIXATION (ORIF) RADIUS / ULNA (WRIST); Surgeon: Frank Garcia MD;  Location: [facility];  Service: Orthopedics   • TONSILLECTOMY     • WISDOM TOOTH EXTRACTION       Family History   Problem Relation Age of Onset   • Cancer Mother    • Breast cancer Mother    • Cancer Father    • Prostate cancer Father    • No Known Problems Brother    • No Known Problems Daughter       Social History   Social History     Substance and Sexual Activity   Alcohol Use Not Currently    Comment: Recovering alcoholic     Social History     Substance and Sexual Activity   Drug Use Never     Social History     Tobacco Use   Smoking Status Former   • Packs/day: 1 00   • Years: 5 00   • Pack years: 5    • Types: Cigarettes   • Quit date: 2005   • Years since quittin 1   Smokeless Tobacco Never       Medications:     Current Outpatient Medications:   •  albuterol (PROVENTIL HFA,VENTOLIN HFA) 90 mcg/act inhaler, Inhale 2 puffs as needed, Disp: , Rfl:   •  budesonide (Rhinocort Allergy) 32 MCG/ACT nasal spray, 1 spray into each nostril daily, Disp: 5 mL, Rfl: 11  •  cetirizine (ZyrTEC) 10 mg tablet, Take 10 mg by mouth daily, Disp: , Rfl:   Allergies   Allergen Reactions   • Penicillins Other (See Comments)     Unknown, childhood allergy       OBJECTIVE    Vitals:   Vitals:    23 0923   BP: 126/72   Pulse: 96   Temp: 97 7 °F (36 5 °C)   SpO2: 97%   Weight: 73 2 kg (161 lb 6 4 oz)   Height: 5' 7" (1 702 m)           Physical Exam  Vitals and nursing note reviewed  Constitutional:       General: He is not in acute distress  Appearance: Normal appearance  HENT:      Head: Normocephalic and atraumatic        Right Ear: Tympanic membrane, ear canal and external ear normal       Left Ear: Tympanic membrane, ear canal and external ear normal       Nose: Nose normal       Mouth/Throat:      Mouth: Mucous membranes are moist       Pharynx: No oropharyngeal exudate or posterior oropharyngeal erythema  Eyes:      Conjunctiva/sclera: Conjunctivae normal    Cardiovascular:      Rate and Rhythm: Normal rate and regular rhythm  Pulmonary:      Effort: Pulmonary effort is normal  No respiratory distress  Breath sounds: Normal breath sounds  Abdominal:      General: Bowel sounds are normal  There is no distension  Palpations: Abdomen is soft  Tenderness: There is no abdominal tenderness  Musculoskeletal:      Right lower leg: No edema  Left lower leg: No edema  Lymphadenopathy:      Cervical: No cervical adenopathy  Skin:     General: Skin is warm and dry  Neurological:      General: No focal deficit present  Mental Status: He is alert     Psychiatric:         Mood and Affect: Mood normal                     Lanie Cobb DO  [facility]   2/6/2023  9:37 AM

## 2023-02-06 ENCOUNTER — TELEPHONE (OUTPATIENT)
Dept: GASTROENTEROLOGY | Facility: CLINIC | Age: 48
End: 2023-02-06

## 2023-02-06 ENCOUNTER — PREP FOR PROCEDURE (OUTPATIENT)
Dept: GASTROENTEROLOGY | Facility: CLINIC | Age: 48
End: 2023-02-06

## 2023-02-06 ENCOUNTER — APPOINTMENT (OUTPATIENT)
Dept: LAB | Facility: CLINIC | Age: 48
End: 2023-02-06

## 2023-02-06 ENCOUNTER — OFFICE VISIT (OUTPATIENT)
Dept: FAMILY MEDICINE CLINIC | Facility: CLINIC | Age: 48
End: 2023-02-06

## 2023-02-06 VITALS
SYSTOLIC BLOOD PRESSURE: 126 MMHG | WEIGHT: 161.4 LBS | HEART RATE: 96 BPM | BODY MASS INDEX: 25.33 KG/M2 | TEMPERATURE: 97.7 F | OXYGEN SATURATION: 97 % | HEIGHT: 67 IN | DIASTOLIC BLOOD PRESSURE: 72 MMHG

## 2023-02-06 DIAGNOSIS — Z11.59 ENCOUNTER FOR HEPATITIS C SCREENING TEST FOR LOW RISK PATIENT: ICD-10-CM

## 2023-02-06 DIAGNOSIS — Z12.11 SCREENING FOR COLON CANCER: Primary | ICD-10-CM

## 2023-02-06 DIAGNOSIS — Z13.220 SCREENING, LIPID: ICD-10-CM

## 2023-02-06 DIAGNOSIS — Z12.11 SCREENING FOR COLON CANCER: ICD-10-CM

## 2023-02-06 DIAGNOSIS — Z13.1 SCREENING FOR DIABETES MELLITUS: ICD-10-CM

## 2023-02-06 DIAGNOSIS — Z00.00 HEALTHCARE MAINTENANCE: Primary | ICD-10-CM

## 2023-02-06 LAB
ALBUMIN SERPL BCP-MCNC: 4.1 G/DL (ref 3.5–5)
ALP SERPL-CCNC: 50 U/L (ref 46–116)
ALT SERPL W P-5'-P-CCNC: 24 U/L (ref 12–78)
ANION GAP SERPL CALCULATED.3IONS-SCNC: 6 MMOL/L (ref 4–13)
AST SERPL W P-5'-P-CCNC: 18 U/L (ref 5–45)
BILIRUB SERPL-MCNC: 0.56 MG/DL (ref 0.2–1)
BUN SERPL-MCNC: 11 MG/DL (ref 5–25)
CALCIUM SERPL-MCNC: 9 MG/DL (ref 8.3–10.1)
CHLORIDE SERPL-SCNC: 106 MMOL/L (ref 96–108)
CHOLEST SERPL-MCNC: 207 MG/DL
CO2 SERPL-SCNC: 25 MMOL/L (ref 21–32)
CREAT SERPL-MCNC: 0.87 MG/DL (ref 0.6–1.3)
GFR SERPL CREATININE-BSD FRML MDRD: 102 ML/MIN/1.73SQ M
GLUCOSE P FAST SERPL-MCNC: 94 MG/DL (ref 65–99)
HCV AB SER QL: NORMAL
HDLC SERPL-MCNC: 69 MG/DL
LDLC SERPL CALC-MCNC: 126 MG/DL (ref 0–100)
NONHDLC SERPL-MCNC: 138 MG/DL
POTASSIUM SERPL-SCNC: 3.8 MMOL/L (ref 3.5–5.3)
PROT SERPL-MCNC: 6.6 G/DL (ref 6.4–8.4)
SODIUM SERPL-SCNC: 137 MMOL/L (ref 135–147)
TRIGL SERPL-MCNC: 62 MG/DL

## 2023-02-06 NOTE — TELEPHONE ENCOUNTER
Scheduled date of colonoscopy (as of today): 4/26/23    Physician performing colonoscopy:  Tank    Location of colonoscopy: [facility]    PASSED OA

## 2023-03-29 ENCOUNTER — OCCMED (OUTPATIENT)
Dept: URGENT CARE | Facility: CLINIC | Age: 48
End: 2023-03-29

## 2023-03-29 DIAGNOSIS — Z02.1 DRUG TESTING, PRE-EMPLOYMENT: Primary | ICD-10-CM

## 2023-04-26 ENCOUNTER — ANESTHESIA EVENT (OUTPATIENT)
Dept: GASTROENTEROLOGY | Facility: HOSPITAL | Age: 48
End: 2023-04-26

## 2023-04-26 ENCOUNTER — ANESTHESIA (OUTPATIENT)
Dept: GASTROENTEROLOGY | Facility: HOSPITAL | Age: 48
End: 2023-04-26

## 2023-04-26 ENCOUNTER — HOSPITAL ENCOUNTER (OUTPATIENT)
Dept: GASTROENTEROLOGY | Facility: HOSPITAL | Age: 48
Setting detail: OUTPATIENT SURGERY
Discharge: HOME | End: 2023-04-26
Attending: INTERNAL MEDICINE

## 2023-04-26 VITALS
RESPIRATION RATE: 20 BRPM | DIASTOLIC BLOOD PRESSURE: 62 MMHG | HEART RATE: 63 BPM | BODY MASS INDEX: 24.01 KG/M2 | OXYGEN SATURATION: 99 % | TEMPERATURE: 99 F | WEIGHT: 153 LBS | HEIGHT: 67 IN | SYSTOLIC BLOOD PRESSURE: 101 MMHG

## 2023-04-26 DIAGNOSIS — Z12.11 SCREENING FOR COLON CANCER: ICD-10-CM

## 2023-04-26 RX ORDER — PROPOFOL 10 MG/ML
INJECTION, EMULSION INTRAVENOUS AS NEEDED
Status: DISCONTINUED | OUTPATIENT
Start: 2023-04-26 | End: 2023-04-26

## 2023-04-26 RX ORDER — SODIUM CHLORIDE, SODIUM LACTATE, POTASSIUM CHLORIDE, CALCIUM CHLORIDE 600; 310; 30; 20 MG/100ML; MG/100ML; MG/100ML; MG/100ML
INJECTION, SOLUTION INTRAVENOUS CONTINUOUS PRN
Status: DISCONTINUED | OUTPATIENT
Start: 2023-04-26 | End: 2023-04-26

## 2023-04-26 RX ADMIN — PROPOFOL 30 MG: 10 INJECTION, EMULSION INTRAVENOUS at 08:08

## 2023-04-26 RX ADMIN — PROPOFOL 30 MG: 10 INJECTION, EMULSION INTRAVENOUS at 08:11

## 2023-04-26 RX ADMIN — PROPOFOL 30 MG: 10 INJECTION, EMULSION INTRAVENOUS at 08:14

## 2023-04-26 RX ADMIN — PROPOFOL 140 MG: 10 INJECTION, EMULSION INTRAVENOUS at 08:03

## 2023-04-26 RX ADMIN — PROPOFOL 30 MG: 10 INJECTION, EMULSION INTRAVENOUS at 08:05

## 2023-04-26 RX ADMIN — PROPOFOL 30 MG: 10 INJECTION, EMULSION INTRAVENOUS at 08:17

## 2023-04-26 RX ADMIN — SODIUM CHLORIDE, SODIUM LACTATE, POTASSIUM CHLORIDE, AND CALCIUM CHLORIDE: .6; .31; .03; .02 INJECTION, SOLUTION INTRAVENOUS at 07:55

## 2023-04-26 NOTE — ANESTHESIA POSTPROCEDURE EVALUATION
Post-Op Assessment Note    CV Status:  Stable  Pain Score: 0    Pain management: adequate     Mental Status:  Sleepy and awake   Hydration Status:  Stable   PONV Controlled:  None   Airway Patency:  Patent      Post Op Vitals Reviewed: Yes      Staff: CRNA         There were no known notable events for this encounter      BP  94/57    Temp 99   Pulse 73   Resp 17   SpO2 99

## 2023-04-26 NOTE — H&P
"History and Physical -  [facility]  Prasad José 49 y o  male MRN: [id number]                  HPI: Prasad José is a 49y o  year old male who presents for colonoscopy for colon cancer screening      REVIEW OF SYSTEMS: Per the HPI, and otherwise unremarkable  Historical Information   Past Medical History:   Diagnosis Date    Allergic Youth    Dogs, cats    Asthma Youth    When i was younger  I outgrew it    History of chickenpox     Mild intermittent asthma without complication     When i was younger  I outgrew it    Wears glasses      Past Surgical History:   Procedure Laterality Date    CIRCUMCISION      [place] OPEN TX RADIAL&ULNAR SHAFT FX W/FIXJ RADIUS/ULNA Left 2020    Procedure: OPEN REDUCTION W/ INTERNAL FIXATION (ORIF) RADIUS / ULNA (WRIST); Surgeon: Kandice Anthony MD;  Location: [facility];  Service: Orthopedics    TONSILLECTOMY      WISDOM TOOTH EXTRACTION       Social History   Social History     Substance and Sexual Activity   Alcohol Use Not Currently    Comment: Recovering alcoholic     Social History     Substance and Sexual Activity   Drug Use Never     Social History     Tobacco Use   Smoking Status Former    Packs/day: 1 00    Years: 5 00    Pack years: 5 00    Types: Cigarettes    Quit date: 2005    Years since quittin 3   Smokeless Tobacco Never     Family History   Problem Relation Age of Onset    Cancer Mother     Breast cancer Mother     Cancer Father     Prostate cancer Father     No Known Problems Brother     No Known Problems Daughter        Meds/Allergies     (Not in a hospital admission)      Allergies   Allergen Reactions    Penicillins Other (See Comments)     Unknown, childhood allergy       Objective     Blood pressure 119/66, pulse 78, temperature 98 9 °F (37 2 °C), temperature source Temporal, resp  rate 16, height 5' 7\" (1 702 m), weight 69 4 kg (153 lb), SpO2 97 %        PHYSICAL EXAM    /66   Pulse 78   Temp " "98 9 °F (37 2 °C) (Temporal)   Resp 16   Ht 5' 7\" (1 702 m)   Wt 69 4 kg (153 lb)   SpO2 97%   BMI 23 96 kg/m²       Gen: NAD  CV: RRR  CHEST: Clear  ABD: soft, NT/ND  EXT: no edema      ASSESSMENT/PLAN:  This is a 49y o  year old male here for colonoscopy, and he is stable and optimized for his procedure        "

## 2023-04-26 NOTE — ANESTHESIA PREPROCEDURE EVALUATION
Procedure:  COLONOSCOPY    Relevant Problems   No relevant active problems   Asthma  Seasonal allergies     Physical Exam    Airway    Mallampati score: I  TM Distance: >3 FB  Neck ROM: full     Dental   No notable dental hx     Cardiovascular      Pulmonary      Other Findings        Anesthesia Plan  ASA Score- 2     Anesthesia Type- IV sedation with anesthesia with ASA Monitors  Additional Monitors:   Airway Plan:           Plan Factors-    Chart reviewed  Patient summary reviewed  Induction- intravenous  Postoperative Plan-     Informed Consent- Anesthetic plan and risks discussed with patient  I personally reviewed this patient with the CRNA  Discussed and agreed on the Anesthesia Plan with the CRNA  Nava Nieves

## 2023-05-02 DIAGNOSIS — R14.0 ABDOMINAL BLOATING: Primary | ICD-10-CM

## 2023-05-03 ENCOUNTER — APPOINTMENT (OUTPATIENT)
Dept: LAB | Facility: CLINIC | Age: 48
End: 2023-05-03
Payer: COMMERCIAL

## 2023-05-03 DIAGNOSIS — R14.0 ABDOMINAL BLOATING: ICD-10-CM

## 2023-05-03 PROCEDURE — 86364 TISS TRNSGLTMNASE EA IG CLAS: CPT

## 2023-05-03 PROCEDURE — 82784 ASSAY IGA/IGD/IGG/IGM EACH: CPT

## 2023-05-03 PROCEDURE — 86231 EMA EACH IG CLASS: CPT

## 2023-05-03 PROCEDURE — 36415 COLL VENOUS BLD VENIPUNCTURE: CPT

## 2023-05-03 PROCEDURE — 86258 DGP ANTIBODY EACH IG CLASS: CPT

## 2023-05-04 LAB
ENDOMYSIUM IGA SER QL: NEGATIVE
GLIADIN PEPTIDE IGA SER-ACNC: 2 UNITS (ref 0–19)
GLIADIN PEPTIDE IGG SER-ACNC: 4 UNITS (ref 0–19)
IGA SERPL-MCNC: 62 MG/DL (ref 90–386)
TTG IGA SER-ACNC: <2 U/ML (ref 0–3)
TTG IGG SER-ACNC: <2 U/ML (ref 0–5)

## 2023-08-01 DIAGNOSIS — J45.20 MILD INTERMITTENT ASTHMA WITHOUT COMPLICATION: Primary | ICD-10-CM

## 2023-08-01 RX ORDER — ALBUTEROL SULFATE 90 UG/1
2 AEROSOL, METERED RESPIRATORY (INHALATION) AS NEEDED
Qty: 18 G | Refills: 1 | Status: SHIPPED | OUTPATIENT
Start: 2023-08-01

## 2023-10-10 ENCOUNTER — OFFICE VISIT (OUTPATIENT)
Dept: FAMILY MEDICINE CLINIC | Facility: CLINIC | Age: 48
End: 2023-10-10
Payer: COMMERCIAL

## 2023-10-10 VITALS
DIASTOLIC BLOOD PRESSURE: 78 MMHG | HEIGHT: 67 IN | BODY MASS INDEX: 24.27 KG/M2 | SYSTOLIC BLOOD PRESSURE: 124 MMHG | HEART RATE: 68 BPM | OXYGEN SATURATION: 98 % | WEIGHT: 154.6 LBS | TEMPERATURE: 97.8 F

## 2023-10-10 DIAGNOSIS — R68.81 EARLY SATIETY: Primary | ICD-10-CM

## 2023-10-10 DIAGNOSIS — R14.0 ABDOMINAL BLOATING: ICD-10-CM

## 2023-10-10 PROCEDURE — 99213 OFFICE O/P EST LOW 20 MIN: CPT | Performed by: NURSE PRACTITIONER

## 2023-10-10 NOTE — ASSESSMENT & PLAN NOTE
Patient has tried eliminating foods without benefit has low appetite and early fullness will check gastric emptying study and labs to update

## 2023-10-10 NOTE — PROGRESS NOTES
Name: Katlyn Lambert      : 1975      MRN: [id number]  Encounter Provider: BA Hancock  Encounter Date: 10/10/2023   Encounter department: [address] [place] [address]     1. Early satiety  Assessment & Plan:  Patient has tried eliminating foods without benefit has low appetite and early fullness will check gastric emptying study and labs to update     Orders:  -     NM gastric emptying; Future; Expected date: 10/10/2023  -     HARITHA Screen w/ Reflex to Titer/Pattern; Future  -     TSH, 3rd generation with Free T4 reflex; Future  -     CBC and differential; Future  -     Comprehensive metabolic panel; Future    2. Abdominal bloating  -     NM gastric emptying; Future; Expected date: 10/10/2023  -     HARITHA Screen w/ Reflex to Titer/Pattern; Future  -     TSH, 3rd generation with Free T4 reflex; Future  -     CBC and differential; Future  -     Comprehensive metabolic panel; Future           Subjective      Patient here today for follow up and reports that he started with symptoms in 2023 and was always feeling fullness or bloated and reports that he was thinking related to foods elimination diet and tried eliminating dairy and gluten and made no difference and cutting out sugar substitutes and eliminating sugar from his diet. No history of abdominal surgery he had his screening colonoscopy was normal in 2023 by Dr. Juarez Mathew and recall in 10 years. Patient denies any heart burn. Patient reports that he is getting full quick and not having an appetite. Has normal bowel movements no other medical conditions and does take a one a day and is taking a probiotic. Review of Systems   Constitutional: Negative for activity change, appetite change, chills, diaphoresis, fatigue, fever and unexpected weight change. HENT: Negative for congestion, ear pain, hearing loss, postnasal drip, sinus pressure, sinus pain, sneezing, sore throat and trouble swallowing.     Eyes: Negative for pain, redness and visual disturbance. Respiratory: Negative for cough and shortness of breath. Cardiovascular: Negative for chest pain, palpitations and leg swelling. Gastrointestinal: Positive for abdominal distention. Negative for abdominal pain, diarrhea, nausea and vomiting. Early satiety    Endocrine: Negative. Genitourinary: Negative. Musculoskeletal: Negative for arthralgias. Skin: Negative. Allergic/Immunologic: Negative. Neurological: Negative for dizziness and light-headedness. Psychiatric/Behavioral: Negative for behavioral problems and dysphoric mood. Current Outpatient Medications on File Prior to Visit   Medication Sig   • albuterol (PROVENTIL HFA,VENTOLIN HFA) 90 mcg/act inhaler Inhale 2 puffs as needed for wheezing   • cetirizine (ZyrTEC) 10 mg tablet Take 10 mg by mouth daily       Objective     /78 (BP Location: Left arm, Patient Position: Sitting)   Pulse 68   Temp 97.8 °F (36.6 °C) (Temporal)   Ht 5' 7" (1.702 m)   Wt 70.1 kg (154 lb 9.6 oz)   SpO2 98%   BMI 24.21 kg/m²     Physical Exam  Vitals and nursing note reviewed. Constitutional:       General: He is not in acute distress. Appearance: He is well-developed. HENT:      Head: Normocephalic and atraumatic. Right Ear: Tympanic membrane normal.      Left Ear: Tympanic membrane normal.      Nose: Nose normal.   Eyes:      Pupils: Pupils are equal, round, and reactive to light. Neck:      Thyroid: No thyromegaly. Cardiovascular:      Rate and Rhythm: Normal rate and regular rhythm. Heart sounds: Normal heart sounds. No murmur heard. Pulmonary:      Effort: Pulmonary effort is normal. No respiratory distress. Breath sounds: Normal breath sounds. No wheezing. Abdominal:      General: Bowel sounds are normal.      Palpations: Abdomen is soft. Musculoskeletal:         General: Normal range of motion. Cervical back: Normal range of motion.    Skin: General: Skin is warm and dry. Neurological:      Mental Status: He is alert and oriented to person, place, and time.        Bibi Fuentes

## 2023-10-11 ENCOUNTER — APPOINTMENT (OUTPATIENT)
Dept: LAB | Facility: HOSPITAL | Age: 48
End: 2023-10-11
Payer: COMMERCIAL

## 2023-10-11 DIAGNOSIS — R14.0 ABDOMINAL BLOATING: ICD-10-CM

## 2023-10-11 DIAGNOSIS — R68.81 EARLY SATIETY: ICD-10-CM

## 2023-10-11 LAB
ALBUMIN SERPL BCP-MCNC: 4.6 G/DL (ref 3.5–5)
ALP SERPL-CCNC: 47 U/L (ref 34–104)
ALT SERPL W P-5'-P-CCNC: 17 U/L (ref 7–52)
ANA SER QL IA: NEGATIVE
ANION GAP SERPL CALCULATED.3IONS-SCNC: 6 MMOL/L
AST SERPL W P-5'-P-CCNC: 17 U/L (ref 13–39)
BASOPHILS # BLD AUTO: 0.02 THOUSANDS/ÂΜL (ref 0–0.1)
BASOPHILS NFR BLD AUTO: 0 % (ref 0–1)
BILIRUB SERPL-MCNC: 0.63 MG/DL (ref 0.2–1)
BUN SERPL-MCNC: 11 MG/DL (ref 5–25)
CALCIUM SERPL-MCNC: 9.4 MG/DL (ref 8.4–10.2)
CHLORIDE SERPL-SCNC: 106 MMOL/L (ref 96–108)
CO2 SERPL-SCNC: 27 MMOL/L (ref 21–32)
CREAT SERPL-MCNC: 1.01 MG/DL (ref 0.6–1.3)
EOSINOPHIL # BLD AUTO: 0.21 THOUSAND/ÂΜL (ref 0–0.61)
EOSINOPHIL NFR BLD AUTO: 5 % (ref 0–6)
ERYTHROCYTE [DISTWIDTH] IN BLOOD BY AUTOMATED COUNT: 11.9 % (ref 11.6–15.1)
GFR SERPL CREATININE-BSD FRML MDRD: 87 ML/MIN/1.73SQ M
GLUCOSE P FAST SERPL-MCNC: 99 MG/DL (ref 65–99)
HCT VFR BLD AUTO: 45.1 % (ref 36.5–49.3)
HGB BLD-MCNC: 15.1 G/DL (ref 12–17)
IMM GRANULOCYTES # BLD AUTO: 0.01 THOUSAND/UL (ref 0–0.2)
IMM GRANULOCYTES NFR BLD AUTO: 0 % (ref 0–2)
LYMPHOCYTES # BLD AUTO: 1.57 THOUSANDS/ÂΜL (ref 0.6–4.47)
LYMPHOCYTES NFR BLD AUTO: 35 % (ref 14–44)
MCH RBC QN AUTO: 30.6 PG (ref 26.8–34.3)
MCHC RBC AUTO-ENTMCNC: 33.5 G/DL (ref 31.4–37.4)
MCV RBC AUTO: 91 FL (ref 82–98)
MONOCYTES # BLD AUTO: 0.34 THOUSAND/ÂΜL (ref 0.17–1.22)
MONOCYTES NFR BLD AUTO: 8 % (ref 4–12)
NEUTROPHILS # BLD AUTO: 2.37 THOUSANDS/ÂΜL (ref 1.85–7.62)
NEUTS SEG NFR BLD AUTO: 52 % (ref 43–75)
NRBC BLD AUTO-RTO: 0 /100 WBCS
PLATELET # BLD AUTO: 213 THOUSANDS/UL (ref 149–390)
PMV BLD AUTO: 9.6 FL (ref 8.9–12.7)
POTASSIUM SERPL-SCNC: 3.9 MMOL/L (ref 3.5–5.3)
PROT SERPL-MCNC: 6.7 G/DL (ref 6.4–8.4)
RBC # BLD AUTO: 4.94 MILLION/UL (ref 3.88–5.62)
SODIUM SERPL-SCNC: 139 MMOL/L (ref 135–147)
TSH SERPL DL<=0.05 MIU/L-ACNC: 0.93 UIU/ML (ref 0.45–4.5)
WBC # BLD AUTO: 4.52 THOUSAND/UL (ref 4.31–10.16)

## 2023-10-11 PROCEDURE — 84443 ASSAY THYROID STIM HORMONE: CPT

## 2023-10-11 PROCEDURE — 80053 COMPREHEN METABOLIC PANEL: CPT

## 2023-10-11 PROCEDURE — 36415 COLL VENOUS BLD VENIPUNCTURE: CPT

## 2023-10-11 PROCEDURE — 85025 COMPLETE CBC W/AUTO DIFF WBC: CPT

## 2023-10-11 PROCEDURE — 86038 ANTINUCLEAR ANTIBODIES: CPT

## 2023-10-19 ENCOUNTER — HOSPITAL ENCOUNTER (OUTPATIENT)
Dept: NON INVASIVE DIAGNOSTICS | Facility: CLINIC | Age: 48
Discharge: HOME | End: 2023-10-19
Payer: COMMERCIAL

## 2023-10-19 DIAGNOSIS — R68.81 EARLY SATIETY: ICD-10-CM

## 2023-10-19 DIAGNOSIS — R14.0 ABDOMINAL BLOATING: ICD-10-CM

## 2023-10-19 PROCEDURE — A9541 TC99M SULFUR COLLOID: HCPCS

## 2023-10-19 PROCEDURE — 78264 GASTRIC EMPTYING IMG STUDY: CPT

## 2023-10-19 PROCEDURE — G1004 CDSM NDSC: HCPCS

## 2023-10-20 DIAGNOSIS — R68.81 EARLY SATIETY: ICD-10-CM

## 2023-10-20 DIAGNOSIS — R14.0 ABDOMINAL BLOATING: Primary | ICD-10-CM

## 2023-11-21 ENCOUNTER — OFFICE VISIT (OUTPATIENT)
Dept: GASTROENTEROLOGY | Facility: AMBULARY SURGERY CENTER | Age: 48
End: 2023-11-21
Payer: COMMERCIAL

## 2023-11-21 VITALS
HEART RATE: 76 BPM | SYSTOLIC BLOOD PRESSURE: 136 MMHG | WEIGHT: 158 LBS | OXYGEN SATURATION: 98 % | DIASTOLIC BLOOD PRESSURE: 76 MMHG | BODY MASS INDEX: 24.8 KG/M2 | HEIGHT: 67 IN

## 2023-11-21 DIAGNOSIS — R14.0 ABDOMINAL BLOATING: Primary | ICD-10-CM

## 2023-11-21 DIAGNOSIS — R68.81 EARLY SATIETY: ICD-10-CM

## 2023-11-21 PROCEDURE — 99203 OFFICE O/P NEW LOW 30 MIN: CPT | Performed by: PHYSICIAN ASSISTANT

## 2023-11-21 NOTE — H&P (VIEW-ONLY)
[facility] - Outpatient Progress Note  Allison Moreno 51 y.o. male MRN: [id number]  Encounter: [id number]    Assessment and Plan    1. Abdominal bloating  -Symptoms since April 2023 with early satiety and bloating postprandially  -October 11, 2023 TSH normal  -Gastric emptying study on October 19, 2023 normal with T 1/2 time of 122 minutes. The time it takes one half of the digested meal to leave the stomach is generally between 60 and 120 minutes. -Celiac serologies negative May 2023  -Would get EGD with small bowel biopsy  -To consider lactulose breath test to rule out small intestinal bacterial overgrowth      2. Early satiety  -See plan above      --------------------------------------------------------------------------------------------------------------------    Chief Complaint: Early satiety and bloating    HPI: Allison Moreno is a 51 y.o. male new to me with past medical history of allergic rhinitis and seasonal allergies who presents today for follow up for postprandial early satiety and bloating. Patient was seen by his primary care on October 10, 2023 with the symptoms. Labs including CBC CMP TSH and HARITHA performed on October 11, 2023 all normal.  Nuclear medicine gastric emptying study done on October 19, 2023 is normal.  He has attempted to eliminate sugar and gluten from his diet without any improvement in his symptoms. He has not yet been checked for celiac disease. He has been taking omeprazole 20 mg over-the-counter for the past 4 weeks and this does seem to be helping. Celiac testing May 2023 negative. Patient denies any nausea, vomiting, abdominal pain, dysphagia, unexpected weight loss, diarrhea, constipation, blood in stool, or black tarry stools.      Endoscopy History:  EGD - none previously  Colonoscopy - 2023 no polyps    Review of Systems:   General: negative for fatigue, fever, night sweats or unexpected weight loss  Psychological: negative for anxiety or depression  Ophthalmic: negative for blurry vision or scleral icterus  ENT: negative for headaches, sore throat or dysphagia  Hematological and Lymphatic: negative for pallor or swollen lymph nodes  Respiratory: negative for cough, shortness of breath or wheezing  Cardiovascular: negative for chest pain, edema or murmur  Gastrointestinal: as mentioned in HPI  Genito-Urinary: negative for dysuria or incontinence  Musculoskeletal: negative for joint pain, joint stiffness or joint swelling  Dermatological: negative for pruritus, rash, or jaundice    Current Medications  Current Outpatient Medications   Medication Sig Dispense Refill   • albuterol (PROVENTIL HFA,VENTOLIN HFA) 90 mcg/act inhaler Inhale 2 puffs as needed for wheezing 18 g 1   • cetirizine (ZyrTEC) 10 mg tablet Take 10 mg by mouth daily       No current facility-administered medications for this visit. Past Medical History  Past Medical History:   Diagnosis Date   • Allergic Youth    Dogs, cats   • Asthma Youth    When i was younger. I outgrew it   • History of chickenpox    • Mild intermittent asthma without complication     When i was younger. I outgrew it   • Wears glasses        Past Surgical History  Past Surgical History:   Procedure Laterality Date   • CIRCUMCISION     • [place] OPEN TX RADIAL&ULNAR SHAFT FX W/FIXJ RADIUS/ULNA Left 09/29/2020    Procedure: OPEN REDUCTION W/ INTERNAL FIXATION (ORIF) RADIUS / ULNA (WRIST);   Surgeon: Korina Mejía MD;  Location: [facility];  Service: Orthopedics   • TONSILLECTOMY     • WISDOM TOOTH EXTRACTION         Past Social History   Social History     Socioeconomic History   • Marital status: /Civil Union     Spouse name: Moraima   • Number of children: 1   • Years of education: None   • Highest education level: None   Occupational History   • None   Tobacco Use   • Smoking status: Former     Packs/day: 1.00     Years: 5.00     Total pack years: 5.00     Types: Cigarettes     Quit date: 1/1/2005 Years since quittin.8   • Smokeless tobacco: Never   Vaping Use   • Vaping Use: Never used   Substance and Sexual Activity   • Alcohol use: Not Currently     Comment: Recovering alcoholic   • Drug use: Never   • Sexual activity: Yes     Partners: Female     Comment: Wife is on birth control pills   Other Topics Concern   • None   Social History Narrative    Who lives in your home: wife, daughter    What type of home do you live in: Single house    Age of your home: 15 yrs    How long have you been living there: 12 yrs    Type of heat: Forced hot air and Other pellet stove    Type of fuel: Electric and Pellets    What type of daniela is in your bedroom: [address] [facility] floor and Tile    Do you have the following in or near your home:    Air products: Central air and Air     Pests: None    Pets: sometimes Dog at work    Are pets allowed in bedroom: No    Open fields, wooded areas nearby:  Wooded areas    Basement: None    Exposure to second hand smoke: No        Habits:    Caffeine: 2-3 cups of coffee daily,    Chocolate: once in a while    Other:     Social Determinants of Health     Financial Resource Strain: Not on file   Food Insecurity: Not on file   Transportation Needs: Not on file   Physical Activity: Sufficiently Active (3/21/2022)    Exercise Vital Sign    • Days of Exercise per Week: 7 days    • Minutes of Exercise per Session: 60 min   Stress: Not on file   Social Connections: Not on file   Intimate Partner Violence: Not on file   Housing Stability: Not on file       Vital Signs  Vitals:    23 1329   BP: 136/76   BP Location: Right arm   Patient Position: Sitting   Cuff Size: Standard   Pulse: 76   SpO2: 98%   Weight: 71.7 kg (158 lb)   Height: 5' 7" (1.702 m)       Physical Exam:  General appearance: alert, cooperative, no distress  HEENT: normocephalic, anicteric, no eye erythema or discharge, no oropharyngeal thrush  Neck: supple  Lungs: CTA b/l, no rales, rhonchi, or wheezing, unlabored respirations  Heart: RRR, no murmur, rubs, or gallops  Abdomen: soft, non-tender, non-distended, normal bowel sounds, no masses or organomegaly  Rectal: deferred  Extremities: no cyanosis, clubbing, or edema  Musculoskeletal: normal gait  Skin: color and texture normal, no jaundice, no rashes or lesions  Psychiatric: alert and oriented, normal affect and behavior                                                          Anand Harvey PA-C

## 2023-11-21 NOTE — PATIENT INSTRUCTIONS
Scheduled date of EGD(as of today):  12/01/23  Physician performing EGD:  Dr Keanu Gonzalez  Location of EGD: [facility]   Instructions reviewed with patient by:  Jennifer Gallardo   Clearances:  N/A

## 2023-11-21 NOTE — PROGRESS NOTES
[facility] - Outpatient Progress Note  Fabian Tang 51 y.o. male MRN: [id number]  Encounter: [id number]    Assessment and Plan    1. Abdominal bloating  -Symptoms since April 2023 with early satiety and bloating postprandially  -October 11, 2023 TSH normal  -Gastric emptying study on October 19, 2023 normal with T 1/2 time of 122 minutes. The time it takes one half of the digested meal to leave the stomach is generally between 60 and 120 minutes. -Celiac serologies negative May 2023  -Would get EGD with small bowel biopsy  -To consider lactulose breath test to rule out small intestinal bacterial overgrowth      2. Early satiety  -See plan above      --------------------------------------------------------------------------------------------------------------------    Chief Complaint: Early satiety and bloating    HPI: Fabian Tang is a 51 y.o. male new to me with past medical history of allergic rhinitis and seasonal allergies who presents today for follow up for postprandial early satiety and bloating. Patient was seen by his primary care on October 10, 2023 with the symptoms. Labs including CBC CMP TSH and HARITHA performed on October 11, 2023 all normal.  Nuclear medicine gastric emptying study done on October 19, 2023 is normal.  He has attempted to eliminate sugar and gluten from his diet without any improvement in his symptoms. He has not yet been checked for celiac disease. He has been taking omeprazole 20 mg over-the-counter for the past 4 weeks and this does seem to be helping. Celiac testing May 2023 negative. Patient denies any nausea, vomiting, abdominal pain, dysphagia, unexpected weight loss, diarrhea, constipation, blood in stool, or black tarry stools.      Endoscopy History:  EGD - none previously  Colonoscopy - 2023 no polyps    Review of Systems:   General: negative for fatigue, fever, night sweats or unexpected weight loss  Psychological: negative for anxiety or depression  Ophthalmic: negative for blurry vision or scleral icterus  ENT: negative for headaches, sore throat or dysphagia  Hematological and Lymphatic: negative for pallor or swollen lymph nodes  Respiratory: negative for cough, shortness of breath or wheezing  Cardiovascular: negative for chest pain, edema or murmur  Gastrointestinal: as mentioned in HPI  Genito-Urinary: negative for dysuria or incontinence  Musculoskeletal: negative for joint pain, joint stiffness or joint swelling  Dermatological: negative for pruritus, rash, or jaundice    Current Medications  Current Outpatient Medications   Medication Sig Dispense Refill   • albuterol (PROVENTIL HFA,VENTOLIN HFA) 90 mcg/act inhaler Inhale 2 puffs as needed for wheezing 18 g 1   • cetirizine (ZyrTEC) 10 mg tablet Take 10 mg by mouth daily       No current facility-administered medications for this visit. Past Medical History  Past Medical History:   Diagnosis Date   • Allergic Youth    Dogs, cats   • Asthma Youth    When i was younger. I outgrew it   • History of chickenpox    • Mild intermittent asthma without complication     When i was younger. I outgrew it   • Wears glasses        Past Surgical History  Past Surgical History:   Procedure Laterality Date   • CIRCUMCISION     • CT OPEN TX RADIAL&ULNAR SHAFT FX W/FIXJ RADIUS/ULNA Left 09/29/2020    Procedure: OPEN REDUCTION W/ INTERNAL FIXATION (ORIF) RADIUS / ULNA (WRIST);   Surgeon: Victoriano Gonzales MD;  Location: [facility];  Service: Orthopedics   • TONSILLECTOMY     • WISDOM TOOTH EXTRACTION         Past Social History   Social History     Socioeconomic History   • Marital status: /Civil Union     Spouse name: Moraima   • Number of children: 1   • Years of education: None   • Highest education level: None   Occupational History   • None   Tobacco Use   • Smoking status: Former     Packs/day: 1.00     Years: 5.00     Total pack years: 5.00     Types: Cigarettes     Quit date: 1/1/2005 Years since quittin.8   • Smokeless tobacco: Never   Vaping Use   • Vaping Use: Never used   Substance and Sexual Activity   • Alcohol use: Not Currently     Comment: Recovering alcoholic   • Drug use: Never   • Sexual activity: Yes     Partners: Female     Comment: Wife is on birth control pills   Other Topics Concern   • None   Social History Narrative    Who lives in your home: wife, daughter    What type of home do you live in: Single house    Age of your home: 15 yrs    How long have you been living there: 12 yrs    Type of heat: Forced hot air and Other pellet stove    Type of fuel: Electric and Pellets    What type of daniela is in your bedroom: [address] [facility] floor and Tile    Do you have the following in or near your home:    Air products: Central air and Air     Pests: None    Pets: sometimes Dog at work    Are pets allowed in bedroom: No    Open fields, wooded areas nearby:  Wooded areas    Basement: None    Exposure to second hand smoke: No        Habits:    Caffeine: 2-3 cups of coffee daily,    Chocolate: once in a while    Other:     Social Determinants of Health     Financial Resource Strain: Not on file   Food Insecurity: Not on file   Transportation Needs: Not on file   Physical Activity: Sufficiently Active (3/21/2022)    Exercise Vital Sign    • Days of Exercise per Week: 7 days    • Minutes of Exercise per Session: 60 min   Stress: Not on file   Social Connections: Not on file   Intimate Partner Violence: Not on file   Housing Stability: Not on file       Vital Signs  Vitals:    23 1329   BP: 136/76   BP Location: Right arm   Patient Position: Sitting   Cuff Size: Standard   Pulse: 76   SpO2: 98%   Weight: 71.7 kg (158 lb)   Height: 5' 7" (1.702 m)       Physical Exam:  General appearance: alert, cooperative, no distress  HEENT: normocephalic, anicteric, no eye erythema or discharge, no oropharyngeal thrush  Neck: supple  Lungs: CTA b/l, no rales, rhonchi, or wheezing, unlabored respirations  Heart: RRR, no murmur, rubs, or gallops  Abdomen: soft, non-tender, non-distended, normal bowel sounds, no masses or organomegaly  Rectal: deferred  Extremities: no cyanosis, clubbing, or edema  Musculoskeletal: normal gait  Skin: color and texture normal, no jaundice, no rashes or lesions  Psychiatric: alert and oriented, normal affect and behavior                                                          Raeann Barr PA-C

## 2023-12-01 ENCOUNTER — ANESTHESIA (OUTPATIENT)
Dept: GASTROENTEROLOGY | Facility: HOSPITAL | Age: 48
End: 2023-12-01

## 2023-12-01 ENCOUNTER — HOSPITAL ENCOUNTER (OUTPATIENT)
Dept: GASTROENTEROLOGY | Facility: HOSPITAL | Age: 48
Setting detail: OUTPATIENT SURGERY
End: 2023-12-01
Payer: COMMERCIAL

## 2023-12-01 ENCOUNTER — ANESTHESIA EVENT (OUTPATIENT)
Dept: GASTROENTEROLOGY | Facility: HOSPITAL | Age: 48
End: 2023-12-01

## 2023-12-01 VITALS
TEMPERATURE: 99.4 F | BODY MASS INDEX: 24.81 KG/M2 | HEART RATE: 67 BPM | DIASTOLIC BLOOD PRESSURE: 69 MMHG | WEIGHT: 158.07 LBS | OXYGEN SATURATION: 97 % | RESPIRATION RATE: 19 BRPM | SYSTOLIC BLOOD PRESSURE: 109 MMHG | HEIGHT: 67 IN

## 2023-12-01 DIAGNOSIS — R68.81 EARLY SATIETY: ICD-10-CM

## 2023-12-01 DIAGNOSIS — R14.0 ABDOMINAL BLOATING: ICD-10-CM

## 2023-12-01 PROBLEM — J45.909 ASTHMA: Status: ACTIVE | Noted: 2023-12-01

## 2023-12-01 PROBLEM — K21.9 GASTROESOPHAGEAL REFLUX DISEASE: Status: ACTIVE | Noted: 2023-12-01

## 2023-12-01 PROCEDURE — 43239 EGD BIOPSY SINGLE/MULTIPLE: CPT | Performed by: INTERNAL MEDICINE

## 2023-12-01 PROCEDURE — 88305 TISSUE EXAM BY PATHOLOGIST: CPT | Performed by: PATHOLOGY

## 2023-12-01 RX ORDER — OMEPRAZOLE 20 MG/1
CAPSULE, DELAYED RELEASE ORAL
COMMUNITY
Start: 2023-11-01 | End: 2023-12-01 | Stop reason: HOSPADM

## 2023-12-01 RX ORDER — SODIUM CHLORIDE, SODIUM LACTATE, POTASSIUM CHLORIDE, CALCIUM CHLORIDE 600; 310; 30; 20 MG/100ML; MG/100ML; MG/100ML; MG/100ML
125 INJECTION, SOLUTION INTRAVENOUS CONTINUOUS
Status: DISCONTINUED | OUTPATIENT
Start: 2023-12-01 | End: 2023-12-05 | Stop reason: HOSPADM

## 2023-12-01 RX ORDER — PROPOFOL 10 MG/ML
INJECTION, EMULSION INTRAVENOUS AS NEEDED
Status: DISCONTINUED | OUTPATIENT
Start: 2023-12-01 | End: 2023-12-01

## 2023-12-01 RX ORDER — PANTOPRAZOLE SODIUM 40 MG/1
40 TABLET, DELAYED RELEASE ORAL DAILY
Qty: 30 TABLET | Refills: 2 | Status: SHIPPED | OUTPATIENT
Start: 2023-12-01

## 2023-12-01 RX ORDER — LIDOCAINE HYDROCHLORIDE 20 MG/ML
INJECTION, SOLUTION EPIDURAL; INFILTRATION; INTRACAUDAL; PERINEURAL AS NEEDED
Status: DISCONTINUED | OUTPATIENT
Start: 2023-12-01 | End: 2023-12-01

## 2023-12-01 RX ADMIN — SODIUM CHLORIDE, SODIUM LACTATE, POTASSIUM CHLORIDE, AND CALCIUM CHLORIDE: .6; .31; .03; .02 INJECTION, SOLUTION INTRAVENOUS at 07:13

## 2023-12-01 RX ADMIN — PROPOFOL 50 MG: 10 INJECTION, EMULSION INTRAVENOUS at 07:36

## 2023-12-01 RX ADMIN — PROPOFOL 150 MG: 10 INJECTION, EMULSION INTRAVENOUS at 07:34

## 2023-12-01 RX ADMIN — LIDOCAINE HYDROCHLORIDE 100 MG: 20 INJECTION, SOLUTION EPIDURAL; INFILTRATION; INTRACAUDAL at 07:34

## 2023-12-01 NOTE — ANESTHESIA POSTPROCEDURE EVALUATION
Post-Op Assessment Note    CV Status:  Stable  Pain Score: 0    Pain management: adequate       Mental Status:  Sleepy and arousable   Hydration Status:  Stable   PONV Controlled:  Controlled   Airway Patency:  Patent     Post Op Vitals Reviewed: Yes    No anethesia notable event occurred.     Staff: CRNA               BP   101/70   Temp      Pulse  87   Resp   12   SpO2   100

## 2023-12-01 NOTE — INTERVAL H&P NOTE
H&P reviewed. After examining the patient I find no changes in the patients condition since the H&P had been written.     Vitals:    12/01/23 0652   BP: 120/68   Pulse: 75   Resp: 15   Temp: 97.8 °F (36.6 °C)   SpO2: 98%

## 2023-12-01 NOTE — ANESTHESIA PREPROCEDURE EVALUATION
Procedure:  EGD    Relevant Problems   GI/HEPATIC   (+) Gastroesophageal reflux disease      PULMONARY   (+) Asthma      Other   (+) Abdominal bloating   (+) Early satiety      H/o alcoholism  Physical Exam    Airway    Mallampati score: II  TM Distance: >3 FB  Neck ROM: full     Dental   Comment: Denies loose teeth     Cardiovascular  Cardiovascular exam normal    Pulmonary  Pulmonary exam normal     Other Findings  Portions of exam deferred due to low yield and/or unknown COVID status      Anesthesia Plan  ASA Score- 2     Anesthesia Type- IV sedation with anesthesia with ASA Monitors. Additional Monitors:     Airway Plan:            Plan Factors-Exercise tolerance (METS): >4 METS. Chart reviewed. Existing labs reviewed. Patient summary reviewed. Patient is not a current smoker. Induction- intravenous. Postoperative Plan-     Informed Consent- Anesthetic plan and risks discussed with patient. I personally reviewed this patient with the CRNA. Discussed and agreed on the Anesthesia Plan with the CRNA. Jennifer Olvera

## 2023-12-07 PROCEDURE — 88305 TISSUE EXAM BY PATHOLOGIST: CPT | Performed by: PATHOLOGY

## 2024-01-24 ENCOUNTER — OFFICE VISIT (OUTPATIENT)
Dept: GASTROENTEROLOGY | Facility: CLINIC | Age: 49
End: 2024-01-24
Payer: COMMERCIAL

## 2024-01-24 VITALS
BODY MASS INDEX: 25.18 KG/M2 | HEIGHT: 67 IN | HEART RATE: 70 BPM | SYSTOLIC BLOOD PRESSURE: 128 MMHG | OXYGEN SATURATION: 98 % | WEIGHT: 160.4 LBS | DIASTOLIC BLOOD PRESSURE: 74 MMHG

## 2024-01-24 DIAGNOSIS — K29.60 OTHER GASTRITIS WITHOUT HEMORRHAGE, UNSPECIFIED CHRONICITY: Primary | ICD-10-CM

## 2024-01-24 PROCEDURE — 99213 OFFICE O/P EST LOW 20 MIN: CPT | Performed by: PHYSICIAN ASSISTANT

## 2024-01-24 NOTE — PROGRESS NOTES
[facility] - Outpatient Follow-up Note  Gabriel Gusman 48 y.o. male MRN: [id number]  Encounter: [id number]          ASSESSMENT AND PLAN:      Gastritis  - He was experiencing early satiety and postprandial bloating since April 2023 and his symptoms have mostly resolved since taking a PPI for about 2 months now  - GES in October was normal  - EGD in December showed mild gastritis, path negative for celiac disease and H. Pylori  - IgA level was low on celiac antibody panel so he will have this repeated with his PCP at his upcoming appt and if low, consider referral to immunology  - He will complete the course of protonix for 3 months and then we discussed stopping it and see how he does; discussed risks of long term PPI use  - He will contact me if he has worsening or recurrent symptoms after stopping the PPI, discussed possible pepcid use v restarting PPI depending on severity of symptoms    ______________________________________________________________________    SUBJECTIVE:  Gabriel is a 49 yo M presenting for follow-up after he had an endoscopy in December for further evaluation of early satiety, bloating that been going on since April of last year particularly postprandially.  His endoscopy showed mild gastritis and pathology was negative for celiac disease or H. pylori.  He had a gastric emptying study in October that was normal.  He reports that he is doing really well and feels essentially back to normal with using the pantoprazole since his endoscopy.  He has noticed that if he tries to eat fried or fatty foods or red meats in particular that he may get some recurrent symptoms so he is generally avoiding these things.  He sticks with lean meats such as chicken and fish and vegetables and he seems to tolerate this well.  He is getting a follow-up blood work for his IgA level since these were low when he had celiac blood work in May of last year.  He otherwise has no concerns or  "complaints as he is doing well.      REVIEW OF SYSTEMS IS OTHERWISE NEGATIVE.      Historical Information   Past Medical History:   Diagnosis Date    Allergic Youth    Dogs, cats    Asthma Youth    When i was younger. I outgrew it    History of chickenpox     Mild intermittent asthma without complication     When i was younger. I outgrew it    Wears glasses      Past Surgical History:   Procedure Laterality Date    CIRCUMCISION      [place] OPEN TX RADIAL&ULNAR SHAFT FX W/FIXJ RADIUS/ULNA Left 2020    Procedure: OPEN REDUCTION W/ INTERNAL FIXATION (ORIF) RADIUS / ULNA (WRIST);  Surgeon: Jersey Mason MD;  Location: [facility];  Service: Orthopedics    TONSILLECTOMY      WISDOM TOOTH EXTRACTION       Social History   Social History     Substance and Sexual Activity   Alcohol Use Not Currently    Comment: Recovering alcoholic     Social History     Substance and Sexual Activity   Drug Use Never     Social History     Tobacco Use   Smoking Status Former    Current packs/day: 0.00    Average packs/day: 1 pack/day for 5.0 years (5.0 ttl pk-yrs)    Types: Cigarettes    Start date: 2000    Quit date: 2005    Years since quittin.0   Smokeless Tobacco Never     Family History   Problem Relation Age of Onset    Cancer Mother     Breast cancer Mother     Cancer Father     Prostate cancer Father     No Known Problems Brother     No Known Problems Daughter        Meds/Allergies       Current Outpatient Medications:     albuterol (PROVENTIL HFA,VENTOLIN HFA) 90 mcg/act inhaler    cetirizine (ZyrTEC) 10 mg tablet    pantoprazole (PROTONIX) 40 mg tablet    Allergies   Allergen Reactions    Penicillins Other (See Comments)     Unknown, childhood allergy           Objective     Blood pressure 128/74, pulse 70, height 5' 7\" (1.702 m), weight 72.8 kg (160 lb 6.4 oz), SpO2 98%. Body mass index is 25.12 kg/m².      PHYSICAL EXAM:      General Appearance:   Alert, cooperative, no distress   HEENT:   Normocephalic, " atraumatic, anicteric.     Neck:  Supple, symmetrical, trachea midline   Lungs:   Clear to auscultation bilaterally; no rales, rhonchi or wheezing; respirations unlabored    Heart::   Regular rate and rhythm; no murmur, rub, or gallop.   Abdomen:   Soft, non-tender, non-distended; normal bowel sounds; no masses, no organomegaly    Genitalia:   Deferred    Rectal:   Deferred    Extremities:  No cyanosis, clubbing or edema    Pulses:  2+ and symmetric    Skin:  No jaundice, rashes, or lesions    Lymph nodes:  No palpable cervical lymphadenopathy        Lab Results:   No visits with results within 1 Day(s) from this visit.   Latest known visit with results is:   Hospital Outpatient Visit on 12/01/2023   Component Date Value    Case Report 12/01/2023                      Value:Surgical Pathology Report                         Case: [id number]                                   Authorizing Provider:  Kelby Fu III, MD   Collected:           12/01/2023 0739              Ordering Location:      [facility]       Received:            12/02/2023 [address] [place] [address] Endoscopy                                                             Pathologist:           Tosha Carbone DO                                                     Specimens:   A) - Duodenum                                                                                       B) - Stomach                                                                               Final Diagnosis 12/01/2023                      Value:This result contains rich text formatting which cannot be displayed here.    Note 12/01/2023                      Value:This result contains rich text formatting which cannot be displayed here.    Additional Information 12/01/2023                      Value:This result contains rich text formatting which cannot be displayed here.    Gross Description 12/01/2023                      Value:This result  contains rich text formatting which cannot be displayed here.    Clinical Information 12/01/2023                      Value:Cold bx r/o celiac         Radiology Results:   No results found.

## 2024-02-06 PROBLEM — R14.0 ABDOMINAL BLOATING: Status: RESOLVED | Noted: 2023-10-10 | Resolved: 2024-02-06

## 2024-02-06 PROBLEM — R68.81 EARLY SATIETY: Status: RESOLVED | Noted: 2023-10-10 | Resolved: 2024-02-06

## 2024-02-06 NOTE — PROGRESS NOTES
Gabriel Gusman 1975 male MRN: [id number]      ASSESSMENT/PLAN  Problem List Items Addressed This Visit    None  Visit Diagnoses     Healthcare maintenance    -  Primary    Screening, lipid        Relevant Orders    Lipid panel    Encounter for immunization        Relevant Orders    FLUZONE: influenza vaccine, quadrivalent, 0.5 mL    Pneumococcal Conjugate Vaccine 20-valent (Pcv20)        Discussed uncertain meaning of low IgA -- pt does not have significant recurrent illness, asthma/allergies have been well controlled. Did offer referral to Allergy/Immunology if pt is interested at any time.     BP WNL   Lipids to screen for HLD; otherwise labs UTD    CRC UTD  Vaccinations: Pneumo given, TDap UTD, Flu given, COVID completed primary + boosters   Encouraged regular physical activity, varied diet, and regular dental/eye exams       Future Appointments   Date Time Provider Department Center   2/7/2024 10:00 AM [facility]EL [facility] LAB CHAIR [facility]   2/10/2025  9:00 AM DO [facility]          SUBJECTIVE  CC: Physical Exam      HPI:  Gabriel Gusman is a 48 y.o. male who presents for annual physical. History reviewed and updated as below.     Pt wondering about low IgA (was seen on Celiac testing)     Review of Systems   Constitutional:  Negative for unexpected weight change.   HENT:  Negative for congestion, ear pain, rhinorrhea and sore throat.    Eyes:  Negative for visual disturbance.   Respiratory:  Negative for cough and shortness of breath.    Cardiovascular:  Negative for chest pain, palpitations and leg swelling.   Gastrointestinal:  Negative for abdominal pain, blood in stool, constipation and diarrhea.   Endocrine: Negative for polyuria.   Genitourinary:  Negative for dysuria and hematuria.   Neurological:  Negative for dizziness and headaches.   Psychiatric/Behavioral:  Negative for sleep disturbance.        Historical Information   The patient history was  reviewed and updated as follows:    Past Medical History:   Diagnosis Date   • Allergic Youth    Dogs, cats   • Asthma Youth    When i was younger. I outgrew it   • History of chickenpox    • Mild intermittent asthma without complication     When i was younger. I outgrew it   • Wears glasses      Past Surgical History:   Procedure Laterality Date   • CIRCUMCISION     • [place] OPEN TX RADIAL&ULNAR SHAFT FX W/FIXJ RADIUS/ULNA Left 2020    Procedure: OPEN REDUCTION W/ INTERNAL FIXATION (ORIF) RADIUS / ULNA (WRIST);  Surgeon: Jersey Mason MD;  Location: [facility] OR;  Service: Orthopedics   • TONSILLECTOMY     • WISDOM TOOTH EXTRACTION       Family History   Problem Relation Age of Onset   • Cancer Mother    • Breast cancer Mother    • Cancer Father    • Prostate cancer Father    • No Known Problems Brother    • No Known Problems Daughter       Social History   Social History     Substance and Sexual Activity   Alcohol Use Not Currently    Comment: Recovering alcoholic     Social History     Substance and Sexual Activity   Drug Use Never     Social History     Tobacco Use   Smoking Status Former   • Current packs/day: 0.00   • Average packs/day: 1 pack/day for 5.0 years (5.0 ttl pk-yrs)   • Types: Cigarettes   • Start date: 2000   • Quit date: 2005   • Years since quittin.1   Smokeless Tobacco Never       Medications:     Current Outpatient Medications:   •  albuterol (PROVENTIL HFA,VENTOLIN HFA) 90 mcg/act inhaler, Inhale 2 puffs as needed for wheezing, Disp: 18 g, Rfl: 1  •  cetirizine (ZyrTEC) 10 mg tablet, Take 10 mg by mouth daily, Disp: , Rfl:   •  pantoprazole (PROTONIX) 40 mg tablet, Take 1 tablet (40 mg total) by mouth daily, Disp: 30 tablet, Rfl: 2  Allergies   Allergen Reactions   • Penicillins Other (See Comments)     Unknown, childhood allergy       OBJECTIVE    Vitals:   Vitals:    24 0922 24 0936   BP: 152/90 122/78   Pulse: 76    Temp: (!) 97.3 °F (36.3 °C)    SpO2: 98%   "  Weight: 78 kg (172 lb)    Height: 5' 7\" (1.702 m)            Physical Exam  Vitals and nursing note reviewed.   Constitutional:       General: He is not in acute distress.     Appearance: Normal appearance.   HENT:      Head: Normocephalic and atraumatic.      Right Ear: Tympanic membrane, ear canal and external ear normal.      Left Ear: Tympanic membrane, ear canal and external ear normal.      Nose: Nose normal.      Mouth/Throat:      Mouth: Mucous membranes are moist.      Pharynx: No oropharyngeal exudate or posterior oropharyngeal erythema.   Eyes:      Conjunctiva/sclera: Conjunctivae normal.   Cardiovascular:      Rate and Rhythm: Normal rate and regular rhythm.   Pulmonary:      Effort: Pulmonary effort is normal. No respiratory distress.      Breath sounds: Normal breath sounds.   Abdominal:      General: Bowel sounds are normal. There is no distension.      Palpations: Abdomen is soft.      Tenderness: There is no abdominal tenderness.   Musculoskeletal:      Right lower leg: No edema.      Left lower leg: No edema.   Lymphadenopathy:      Cervical: No cervical adenopathy.   Skin:     General: Skin is warm and dry.   Neurological:      Mental Status: He is alert.      Comments: Grossly intact   Psychiatric:         Mood and Affect: Mood normal.                    Lanie Cobb DO  [facility]   2/7/2024  9:59 AM    "

## 2024-02-07 ENCOUNTER — OFFICE VISIT (OUTPATIENT)
Dept: FAMILY MEDICINE CLINIC | Facility: CLINIC | Age: 49
End: 2024-02-07
Payer: COMMERCIAL

## 2024-02-07 ENCOUNTER — APPOINTMENT (OUTPATIENT)
Dept: LAB | Facility: CLINIC | Age: 49
End: 2024-02-07
Payer: COMMERCIAL

## 2024-02-07 VITALS
BODY MASS INDEX: 27 KG/M2 | WEIGHT: 172 LBS | HEIGHT: 67 IN | OXYGEN SATURATION: 98 % | TEMPERATURE: 97.3 F | HEART RATE: 76 BPM | SYSTOLIC BLOOD PRESSURE: 122 MMHG | DIASTOLIC BLOOD PRESSURE: 78 MMHG

## 2024-02-07 DIAGNOSIS — Z23 ENCOUNTER FOR IMMUNIZATION: ICD-10-CM

## 2024-02-07 DIAGNOSIS — Z00.00 HEALTHCARE MAINTENANCE: Primary | ICD-10-CM

## 2024-02-07 DIAGNOSIS — Z13.220 SCREENING, LIPID: ICD-10-CM

## 2024-02-07 PROBLEM — J45.20 MILD INTERMITTENT ASTHMA WITHOUT COMPLICATION: Status: ACTIVE | Noted: 2023-12-01

## 2024-02-07 LAB
CHOLEST SERPL-MCNC: 249 MG/DL
HDLC SERPL-MCNC: 74 MG/DL
LDLC SERPL CALC-MCNC: 163 MG/DL (ref 0–100)
NONHDLC SERPL-MCNC: 175 MG/DL
TRIGL SERPL-MCNC: 58 MG/DL

## 2024-02-07 PROCEDURE — 90471 IMMUNIZATION ADMIN: CPT | Performed by: FAMILY MEDICINE

## 2024-02-07 PROCEDURE — 90677 PCV20 VACCINE IM: CPT | Performed by: FAMILY MEDICINE

## 2024-02-07 PROCEDURE — 90686 IIV4 VACC NO PRSV 0.5 ML IM: CPT | Performed by: FAMILY MEDICINE

## 2024-02-07 PROCEDURE — 36415 COLL VENOUS BLD VENIPUNCTURE: CPT

## 2024-02-07 PROCEDURE — 99396 PREV VISIT EST AGE 40-64: CPT | Performed by: FAMILY MEDICINE

## 2024-02-07 PROCEDURE — 80061 LIPID PANEL: CPT

## 2024-02-07 PROCEDURE — 90472 IMMUNIZATION ADMIN EACH ADD: CPT | Performed by: FAMILY MEDICINE

## 2024-04-30 ENCOUNTER — OFFICE VISIT (OUTPATIENT)
Dept: FAMILY MEDICINE CLINIC | Facility: CLINIC | Age: 49
End: 2024-04-30
Payer: COMMERCIAL

## 2024-04-30 VITALS
OXYGEN SATURATION: 98 % | DIASTOLIC BLOOD PRESSURE: 78 MMHG | SYSTOLIC BLOOD PRESSURE: 116 MMHG | HEIGHT: 67 IN | BODY MASS INDEX: 23.9 KG/M2 | TEMPERATURE: 99.3 F | WEIGHT: 152.3 LBS | HEART RATE: 92 BPM

## 2024-04-30 DIAGNOSIS — R05.1 ACUTE COUGH: ICD-10-CM

## 2024-04-30 DIAGNOSIS — J01.00 ACUTE NON-RECURRENT MAXILLARY SINUSITIS: Primary | ICD-10-CM

## 2024-04-30 LAB
SARS-COV-2 AG UPPER RESP QL IA: NEGATIVE
VALID CONTROL: NORMAL

## 2024-04-30 PROCEDURE — 99213 OFFICE O/P EST LOW 20 MIN: CPT | Performed by: PHYSICIAN ASSISTANT

## 2024-04-30 PROCEDURE — 87811 SARS-COV-2 COVID19 W/OPTIC: CPT | Performed by: PHYSICIAN ASSISTANT

## 2024-04-30 RX ORDER — DOXYCYCLINE HYCLATE 100 MG
100 TABLET ORAL 2 TIMES DAILY
Qty: 14 TABLET | Refills: 0 | Status: SHIPPED | OUTPATIENT
Start: 2024-04-30 | End: 2024-05-07

## 2024-04-30 RX ORDER — BENZONATATE 200 MG/1
200 CAPSULE ORAL 3 TIMES DAILY PRN
Qty: 20 CAPSULE | Refills: 0 | Status: SHIPPED | OUTPATIENT
Start: 2024-04-30

## 2024-04-30 NOTE — PROGRESS NOTES
Name: Gabriel Gusman      : 1975      MRN: [id number]  Encounter Provider: Esteban Foreman PA-C  Encounter Date: 2024   Encounter department: [facility]    Assessment & Plan     1. Acute non-recurrent maxillary sinusitis  -     doxycycline hyclate (VIBRA-TABS) 100 mg tablet; Take 1 tablet (100 mg total) by mouth 2 (two) times a day for 7 days    2. Acute cough  -     benzonatate (TESSALON) 200 MG capsule; Take 1 capsule (200 mg total) by mouth 3 (three) times a day as needed for cough    Recommend delayed antibiotic therapy. Trial flonase, claritin, mucinex, tylenol for 48 hours. If no improvement begin doxycycline. Fluids and rest. Follow up prn. Covid testing negative.        Subjective     Pt presents with 6 days of cough, congestion, rhinorrhea, sinus pressure, fatigue. Has hx of environmental allergies but this feels different. No objective fevers. Eating/drinking less than normal. No SOB.       Review of Systems   Constitutional:  Positive for fatigue. Negative for chills and fever.   HENT:  Positive for congestion, postnasal drip, rhinorrhea, sinus pressure, sinus pain and sore throat. Negative for ear pain, hearing loss, nosebleeds and sneezing.    Eyes:  Negative for pain, discharge, itching and visual disturbance.   Respiratory:  Positive for cough. Negative for chest tightness, shortness of breath and wheezing.    Cardiovascular:  Negative for chest pain, palpitations and leg swelling.   Gastrointestinal:  Negative for abdominal pain, blood in stool, constipation, diarrhea, nausea and vomiting.   Neurological:  Negative for dizziness, light-headedness and numbness.       Past Medical History:   Diagnosis Date   • Allergic Youth    Dogs, cats   • Asthma Youth    When i was younger. I outgrew it   • History of chickenpox    • Mild intermittent asthma without complication     When i was younger. I outgrew it   • Wears glasses      Past Surgical History:   Procedure  Laterality Date   • CIRCUMCISION     • HI OPEN TX RADIAL&ULNAR SHAFT FX W/FIXJ RADIUS/ULNA Left 2020    Procedure: OPEN REDUCTION W/ INTERNAL FIXATION (ORIF) RADIUS / ULNA (WRIST);  Surgeon: Jersey Mason MD;  Location: [facility];  Service: Orthopedics   • TONSILLECTOMY     • WISDOM TOOTH EXTRACTION       Family History   Problem Relation Age of Onset   • Cancer Mother    • Breast cancer Mother    • Cancer Father    • Prostate cancer Father    • No Known Problems Brother    • No Known Problems Daughter      Social History     Socioeconomic History   • Marital status: /Civil Union     Spouse name: Moraima   • Number of children: 1   • Years of education: None   • Highest education level: None   Occupational History   • None   Tobacco Use   • Smoking status: Former     Current packs/day: 0.00     Average packs/day: 1 pack/day for 5.0 years (5.0 ttl pk-yrs)     Types: Cigarettes     Start date: 2000     Quit date: 2005     Years since quittin.3   • Smokeless tobacco: Never   Vaping Use   • Vaping status: Never Used   Substance and Sexual Activity   • Alcohol use: Not Currently     Comment: Recovering alcoholic   • Drug use: Never   • Sexual activity: Yes     Partners: Female     Comment: Wife is on birth control pills   Other Topics Concern   • None   Social History Narrative    Who lives in your home: wife, daughter    What type of home do you live in: Single house    Age of your home: 15 yrs    How long have you been living there: 12 yrs    Type of heat: Forced hot air and Other pellet stove    Type of fuel: Electric and Pellets    What type of daniela is in your bedroom: Carpet, Hardwood floor and Tile    Do you have the following in or near your home:    Air products: Central air and Air     Pests: None    Pets: sometimes Dog at work    Are pets allowed in bedroom: No    Open fields, wooded areas nearby: Wooded areas    Basement: None    Exposure to second hand smoke: No         "Habits:    Caffeine: 2-3 cups of coffee daily,    Chocolate: once in a while    Other:     Social Determinants of Health     Financial Resource Strain: Not on file   Food Insecurity: Not on file   Transportation Needs: Not on file   Physical Activity: Sufficiently Active (3/21/2022)    Exercise Vital Sign    • Days of Exercise per Week: 7 days    • Minutes of Exercise per Session: 60 min   Stress: Not on file   Social Connections: Not on file   Intimate Partner Violence: Not on file   Housing Stability: Not on file     Current Outpatient Medications on File Prior to Visit   Medication Sig   • albuterol (PROVENTIL HFA,VENTOLIN HFA) 90 mcg/act inhaler Inhale 2 puffs as needed for wheezing   • cetirizine (ZyrTEC) 10 mg tablet Take 10 mg by mouth daily   • pantoprazole (PROTONIX) 40 mg tablet Take 1 tablet (40 mg total) by mouth daily     Allergies   Allergen Reactions   • Penicillins Other (See Comments)     Unknown, childhood allergy     Immunization History   Administered Date(s) Administered   • COVID-19 J&J ([place]) vaccine 0.5 mL 03/13/2021   • COVID-19 PFIZER VACCINE 0.3 ML IM 11/17/2021   • COVID-19 Pfizer Vac BIVALENT Gagandeep-sucrose 12 Yr+ IM 11/02/2022   • COVID-19, unspecified 03/13/2021   • Hep A, ped/adol, 2 dose 09/21/2011   • Hep B, adult 09/21/2011, 10/21/2011, 05/17/2013   • INFLUENZA 10/17/2018, 11/02/2022   • Influenza, injectable, quadrivalent, preservative free 0.5 mL 11/03/2020, 11/15/2021, 02/07/2024   • Pneumococcal Conjugate Vaccine 20-valent (Pcv20), Polysace 02/07/2024   • Tdap 05/25/2011, 02/03/2022   • Tuberculin Skin Test-PPD Intradermal 05/25/2011       Objective     /78   Pulse 92   Temp 99.3 °F (37.4 °C)   Ht 5' 7\" (1.702 m)   Wt 69.1 kg (152 lb 4.8 oz)   SpO2 98%   BMI 23.85 kg/m²     Physical Exam  Vitals and nursing note reviewed.   Constitutional:       General: He is not in acute distress.     Appearance: Normal appearance.   HENT:      Head: Normocephalic and atraumatic. "      Right Ear: Tympanic membrane normal.      Left Ear: Tympanic membrane normal.      Nose: Congestion and rhinorrhea present.      Right Sinus: Maxillary sinus tenderness present. No frontal sinus tenderness.      Left Sinus: Maxillary sinus tenderness present. No frontal sinus tenderness.      Mouth/Throat:      Mouth: Mucous membranes are moist.      Pharynx: Oropharynx is clear. No oropharyngeal exudate or posterior oropharyngeal erythema.   Eyes:      Pupils: Pupils are equal, round, and reactive to light.   Cardiovascular:      Rate and Rhythm: Normal rate and regular rhythm.      Heart sounds: Normal heart sounds.   Pulmonary:      Effort: Pulmonary effort is normal. No respiratory distress.      Breath sounds: Normal breath sounds. No wheezing, rhonchi or rales.   Musculoskeletal:         General: Normal range of motion.      Cervical back: Normal range of motion and neck supple.   Lymphadenopathy:      Cervical: No cervical adenopathy.   Skin:     General: Skin is warm and dry.   Neurological:      Mental Status: He is alert and oriented to person, place, and time.   Psychiatric:         Mood and Affect: Mood and affect normal.       Esteban Foreman PA-C

## 2024-05-14 DIAGNOSIS — Z00.6 ENCOUNTER FOR EXAMINATION FOR NORMAL COMPARISON OR CONTROL IN CLINICAL RESEARCH PROGRAM: ICD-10-CM

## 2024-05-21 ENCOUNTER — APPOINTMENT (OUTPATIENT)
Dept: LAB | Facility: CLINIC | Age: 49
End: 2024-05-21

## 2024-05-21 DIAGNOSIS — Z00.6 ENCOUNTER FOR EXAMINATION FOR NORMAL COMPARISON OR CONTROL IN CLINICAL RESEARCH PROGRAM: ICD-10-CM

## 2024-05-21 PROCEDURE — 36415 COLL VENOUS BLD VENIPUNCTURE: CPT

## 2024-06-11 DIAGNOSIS — J45.20 MILD INTERMITTENT ASTHMA WITHOUT COMPLICATION: ICD-10-CM

## 2024-06-11 RX ORDER — ALBUTEROL SULFATE 90 UG/1
2 AEROSOL, METERED RESPIRATORY (INHALATION) AS NEEDED
Qty: 18 G | Refills: 0 | Status: SHIPPED | OUTPATIENT
Start: 2024-06-11

## 2024-06-13 LAB
APOB+LDLR+PCSK9 GENE MUT ANL BLD/T: NOT DETECTED
BRCA1+BRCA2 DEL+DUP + FULL MUT ANL BLD/T: NOT DETECTED
MLH1+MSH2+MSH6+PMS2 GN DEL+DUP+FUL M: NOT DETECTED

## 2024-07-01 ENCOUNTER — APPOINTMENT (OUTPATIENT)
Dept: URGENT CARE | Facility: CLINIC | Age: 49
End: 2024-07-01

## 2024-07-30 ENCOUNTER — VBI (OUTPATIENT)
Dept: ADMINISTRATIVE | Facility: OTHER | Age: 49
End: 2024-07-30

## 2024-07-30 NOTE — TELEPHONE ENCOUNTER
07/30/24 11:23 AM     Chart reviewed for CRC: Colonoscopy was/were not submitted to the patient's insurance.     Bea Rubalcava   [facility]

## 2024-08-03 DIAGNOSIS — J45.20 MILD INTERMITTENT ASTHMA WITHOUT COMPLICATION: ICD-10-CM

## 2024-08-04 RX ORDER — ALBUTEROL SULFATE 90 UG/1
2 AEROSOL, METERED RESPIRATORY (INHALATION) AS NEEDED
Qty: 8.5 G | Refills: 2 | Status: SHIPPED | OUTPATIENT
Start: 2024-08-04

## 2025-01-09 DIAGNOSIS — Z13.220 SCREENING, LIPID: ICD-10-CM

## 2025-01-09 DIAGNOSIS — J45.20 MILD INTERMITTENT ASTHMA WITHOUT COMPLICATION: Primary | ICD-10-CM

## 2025-01-09 DIAGNOSIS — Z13.1 SCREENING FOR DIABETES MELLITUS: ICD-10-CM

## 2025-01-15 ENCOUNTER — OFFICE VISIT (OUTPATIENT)
Dept: GASTROENTEROLOGY | Facility: CLINIC | Age: 50
End: 2025-01-15
Payer: COMMERCIAL

## 2025-01-15 VITALS
DIASTOLIC BLOOD PRESSURE: 82 MMHG | BODY MASS INDEX: 24.8 KG/M2 | SYSTOLIC BLOOD PRESSURE: 110 MMHG | HEIGHT: 67 IN | WEIGHT: 158 LBS | OXYGEN SATURATION: 99 % | HEART RATE: 69 BPM | TEMPERATURE: 98.2 F

## 2025-01-15 DIAGNOSIS — R10.13 EPIGASTRIC PAIN: ICD-10-CM

## 2025-01-15 DIAGNOSIS — R14.0 ABDOMINAL BLOATING: Primary | ICD-10-CM

## 2025-01-15 PROCEDURE — 99213 OFFICE O/P EST LOW 20 MIN: CPT | Performed by: PHYSICIAN ASSISTANT

## 2025-01-15 RX ORDER — PANTOPRAZOLE SODIUM 40 MG/1
40 TABLET, DELAYED RELEASE ORAL DAILY
Qty: 90 TABLET | Refills: 0 | Status: SHIPPED | OUTPATIENT
Start: 2025-01-15

## 2025-01-15 NOTE — PROGRESS NOTES
Name: Gabriel Gusman      : 1975      MRN: [id number]  Encounter Provider: Faye Ferro PA-C  Encounter Date: 1/15/2025   Encounter department: [facility] [place]  :  Assessment & Plan  Epigastric pain  Patient reports that historically he has done well after PPI course.  However, was told that this is not something to stay on long-term.  Went over chronic risks of PPI usage.  However, patient was questioning whether or not it would be beneficial to stay on a low dose.  -Check right upper quadrant ultrasound to rule out biliary etiology  -Already had a negative gastric emptying study in   -Pantoprazole course prescribed again  Abdominal bloating    Orders:    US right upper quadrant; Future    Can consider testing for SIBO if not improving.  We also discussed the possibility of lactose or gluten intolerance.    Follow-up in 8 to 12 weeks.  Sooner if patient is not improving.      History of Present Illness     Gabriel Gusman is a 49 y.o. male who presents for epigastric discomfort.  Patient reports that he had a similar episode in  in .  This time, his flareup began around [place] time.  He denies associated nausea, vomiting, change in bowel habits, heartburn or regurgitation.  Patient does report associated bloating.    Had a previous gastric emptying study  ordered by his PCP, which was normal.  He has had no other abdominal imaging.  He is going to have blood work soon for his annual PCP appointment.    Last EGD was in 2023 performed Dr. Fu revealing mild erosive gastritis.  Last colonoscopy was done in 2023 also with Dr. Fu revealing no polyps.  According to report the colonoscopy was normal.      Review of Systems   Constitutional:  Negative for appetite change, chills, diaphoresis, fatigue, fever and unexpected weight change.   HENT:  Negative for ear pain, sore throat and trouble swallowing.    Eyes:  Negative for  "pain, discharge, redness and visual disturbance.   Respiratory:  Negative for cough, shortness of breath and wheezing.    Cardiovascular:  Negative for chest pain and palpitations.   Gastrointestinal:  Positive for abdominal pain. Negative for anal bleeding, blood in stool, constipation, diarrhea, nausea, rectal pain and vomiting.   Endocrine: Negative for cold intolerance and heat intolerance.   Genitourinary:  Negative for dysuria and hematuria.   Musculoskeletal:  Negative for arthralgias, back pain, joint swelling and myalgias.   Skin:  Negative for color change, pallor and rash.   Neurological:  Negative for dizziness, tremors, seizures, syncope, weakness, light-headedness, numbness and headaches.   Hematological:  Negative for adenopathy. Does not bruise/bleed easily.   Psychiatric/Behavioral:  Negative for behavioral problems, confusion, dysphoric mood and sleep disturbance. The patient is not nervous/anxious.    All other systems reviewed and are negative.         Objective   /82 (BP Location: Left arm, Patient Position: Sitting, Cuff Size: Standard)   Pulse 69   Temp 98.2 °F (36.8 °C) (Tympanic)   Ht 5' 7\" (1.702 m)   Wt 71.7 kg (158 lb)   SpO2 99%   BMI 24.75 kg/m²      Physical Exam  Constitutional:       General: He is not in acute distress.     Appearance: He is well-developed. He is not diaphoretic.   HENT:      Head: Normocephalic and atraumatic.   Eyes:      General: No scleral icterus.     Conjunctiva/sclera: Conjunctivae normal.      Pupils: Pupils are equal, round, and reactive to light.   Neck:      Thyroid: No thyromegaly.      Trachea: No tracheal deviation.   Cardiovascular:      Rate and Rhythm: Normal rate and regular rhythm.   Pulmonary:      Effort: Pulmonary effort is normal.      Breath sounds: Normal breath sounds. No wheezing or rales.   Abdominal:      General: Bowel sounds are normal. There is no distension.      Palpations: Abdomen is soft. Abdomen is not rigid. There is " no mass.      Tenderness: There is no abdominal tenderness. There is no guarding or rebound.   Musculoskeletal:      Cervical back: Neck supple.   Lymphadenopathy:      Cervical: No cervical adenopathy.   Skin:     General: Skin is warm and dry.      Findings: No erythema or rash.   Neurological:      Mental Status: He is alert and oriented to person, place, and time.   Psychiatric:         Behavior: Behavior normal.

## 2025-01-16 ENCOUNTER — APPOINTMENT (OUTPATIENT)
Dept: LAB | Facility: CLINIC | Age: 50
End: 2025-01-16
Payer: COMMERCIAL

## 2025-01-16 DIAGNOSIS — Z13.1 SCREENING FOR DIABETES MELLITUS: ICD-10-CM

## 2025-01-16 DIAGNOSIS — Z13.220 SCREENING, LIPID: ICD-10-CM

## 2025-01-16 DIAGNOSIS — J45.20 MILD INTERMITTENT ASTHMA WITHOUT COMPLICATION: ICD-10-CM

## 2025-01-16 LAB
BASOPHILS # BLD AUTO: 0.03 THOUSANDS/ΜL (ref 0–0.1)
BASOPHILS NFR BLD AUTO: 1 % (ref 0–1)
EOSINOPHIL # BLD AUTO: 0.3 THOUSAND/ΜL (ref 0–0.61)
EOSINOPHIL NFR BLD AUTO: 6 % (ref 0–6)
ERYTHROCYTE [DISTWIDTH] IN BLOOD BY AUTOMATED COUNT: 11.9 % (ref 11.6–15.1)
HCT VFR BLD AUTO: 48.4 % (ref 36.5–49.3)
HGB BLD-MCNC: 15.8 G/DL (ref 12–17)
IMM GRANULOCYTES # BLD AUTO: 0.01 THOUSAND/UL (ref 0–0.2)
IMM GRANULOCYTES NFR BLD AUTO: 0 % (ref 0–2)
LYMPHOCYTES # BLD AUTO: 2.03 THOUSANDS/ΜL (ref 0.6–4.47)
LYMPHOCYTES NFR BLD AUTO: 39 % (ref 14–44)
MCH RBC QN AUTO: 30.7 PG (ref 26.8–34.3)
MCHC RBC AUTO-ENTMCNC: 32.6 G/DL (ref 31.4–37.4)
MCV RBC AUTO: 94 FL (ref 82–98)
MONOCYTES # BLD AUTO: 0.4 THOUSAND/ΜL (ref 0.17–1.22)
MONOCYTES NFR BLD AUTO: 8 % (ref 4–12)
NEUTROPHILS # BLD AUTO: 2.39 THOUSANDS/ΜL (ref 1.85–7.62)
NEUTS SEG NFR BLD AUTO: 46 % (ref 43–75)
NRBC BLD AUTO-RTO: 0 /100 WBCS
PLATELET # BLD AUTO: 246 THOUSANDS/UL (ref 149–390)
PMV BLD AUTO: 10.6 FL (ref 8.9–12.7)
RBC # BLD AUTO: 5.14 MILLION/UL (ref 3.88–5.62)
WBC # BLD AUTO: 5.16 THOUSAND/UL (ref 4.31–10.16)

## 2025-01-16 PROCEDURE — 80061 LIPID PANEL: CPT

## 2025-01-16 PROCEDURE — 36415 COLL VENOUS BLD VENIPUNCTURE: CPT

## 2025-01-16 PROCEDURE — 80053 COMPREHEN METABOLIC PANEL: CPT

## 2025-01-16 PROCEDURE — 85025 COMPLETE CBC W/AUTO DIFF WBC: CPT

## 2025-01-17 ENCOUNTER — RESULTS FOLLOW-UP (OUTPATIENT)
Dept: FAMILY MEDICINE CLINIC | Facility: CLINIC | Age: 50
End: 2025-01-17

## 2025-01-17 LAB
ALBUMIN SERPL BCG-MCNC: 4.8 G/DL (ref 3.5–5)
ALP SERPL-CCNC: 38 U/L (ref 34–104)
ALT SERPL W P-5'-P-CCNC: 20 U/L (ref 7–52)
ANION GAP SERPL CALCULATED.3IONS-SCNC: 10 MMOL/L (ref 4–13)
AST SERPL W P-5'-P-CCNC: 19 U/L (ref 13–39)
BILIRUB SERPL-MCNC: 0.5 MG/DL (ref 0.2–1)
BUN SERPL-MCNC: 18 MG/DL (ref 5–25)
CALCIUM SERPL-MCNC: 8.8 MG/DL (ref 8.4–10.2)
CHLORIDE SERPL-SCNC: 102 MMOL/L (ref 96–108)
CHOLEST SERPL-MCNC: 194 MG/DL (ref ?–200)
CO2 SERPL-SCNC: 26 MMOL/L (ref 21–32)
CREAT SERPL-MCNC: 1 MG/DL (ref 0.6–1.3)
GFR SERPL CREATININE-BSD FRML MDRD: 87 ML/MIN/1.73SQ M
GLUCOSE P FAST SERPL-MCNC: 92 MG/DL (ref 65–99)
HDLC SERPL-MCNC: 56 MG/DL
LDLC SERPL CALC-MCNC: 120 MG/DL (ref 0–100)
NONHDLC SERPL-MCNC: 138 MG/DL
POTASSIUM SERPL-SCNC: 4.2 MMOL/L (ref 3.5–5.3)
PROT SERPL-MCNC: 7.2 G/DL (ref 6.4–8.4)
SODIUM SERPL-SCNC: 138 MMOL/L (ref 135–147)
TRIGL SERPL-MCNC: 88 MG/DL (ref ?–150)

## 2025-01-22 ENCOUNTER — HOSPITAL ENCOUNTER (OUTPATIENT)
Dept: ULTRASOUND IMAGING | Facility: HOSPITAL | Age: 50
Discharge: HOME | End: 2025-01-22
Payer: COMMERCIAL

## 2025-01-22 DIAGNOSIS — R10.13 EPIGASTRIC PAIN: ICD-10-CM

## 2025-01-22 DIAGNOSIS — R14.0 ABDOMINAL BLOATING: ICD-10-CM

## 2025-01-22 PROCEDURE — 76705 ECHO EXAM OF ABDOMEN: CPT

## 2025-01-24 ENCOUNTER — RESULTS FOLLOW-UP (OUTPATIENT)
Dept: GASTROENTEROLOGY | Facility: CLINIC | Age: 50
End: 2025-01-24

## 2025-01-24 NOTE — TELEPHONE ENCOUNTER
I spoke directly with patient.  He stated he will  the SIBO kit today.  I printed the instructions and have it in the box for him

## 2025-01-27 ENCOUNTER — TELEPHONE (OUTPATIENT)
Dept: GASTROENTEROLOGY | Facility: CLINIC | Age: 50
End: 2025-01-27

## 2025-01-27 NOTE — TELEPHONE ENCOUNTER
Patient dropped off SIBO test. Sent it via  to [facility] Attn: Kourtney Reynoso

## 2025-01-30 ENCOUNTER — CLINICAL SUPPORT (OUTPATIENT)
Dept: GASTROENTEROLOGY | Facility: CLINIC | Age: 50
End: 2025-01-30
Payer: COMMERCIAL

## 2025-01-30 DIAGNOSIS — R14.0 BLOATING: Primary | ICD-10-CM

## 2025-01-30 PROCEDURE — 91065 BREATH HYDROGEN/METHANE TEST: CPT | Performed by: PHYSICIAN ASSISTANT

## 2025-01-30 NOTE — PROGRESS NOTES
[facility]       Bacterial Overgrowth Analytical Record    Gabriel Gusman 49 y.o. male MRN: [id number]      Date of Test: 1/26/25    Substrate Given: Lactulose    Ordering Provider: Faye Ferro PA-C    Medical Assistant: Katarzyna Huizar    Symptoms: bloating    The patient presents for bacterial overgrowth testing.    Patient fasted overnight. Baseline readings obtained.   Breath test performed every 20 min for a total of 3 hr    Sample Clock Time ppmH2 ppmCH4 Co2% Han   Baseline 9:00 am   40 10 3.4 1.61   #1  20 minutes 9:20 am 39 10 2.7 2.03   #2  40 minutes 9:40 am 32 9 3.2 1.71   #3  60 minutes 10:00 am 25 10 3.3 1.66   #4  80 minutes 10:20 am 85 12 3.1 1.77   #5  100 minutes 10:40 am 85 12 3.1 1.77   #6  120 minutes 11:00 am 61 12 2.8 1.96   #7  140 minutes 11:20 am 59 11 2.9 1.89   #8  160 minutes 11:40 am 63 12 3.2 1.71   #9  180 minutes 12:00 pm 55 11 3.0 1.83       Physician interpretation: Though the test is technically positive for SIBO, the baseline hydrogen is quite high and the result is overall indeterminate.  Would recommend repeating.

## 2025-02-03 ENCOUNTER — TELEPHONE (OUTPATIENT)
Dept: GASTROENTEROLOGY | Facility: CLINIC | Age: 50
End: 2025-02-03

## 2025-02-03 NOTE — TELEPHONE ENCOUNTER
"I spoke directly with patient, results and recommendations reviewed.  he \"will  the kit sometime this week\"          Faye Ferro PA-C  [facility] [place] [facility]  Please let patient know the test was borderline, and they are recommending he repeat the SIBO test  "

## 2025-02-05 ENCOUNTER — TELEPHONE (OUTPATIENT)
Dept: GASTROENTEROLOGY | Facility: CLINIC | Age: 50
End: 2025-02-05

## 2025-02-05 NOTE — TELEPHONE ENCOUNTER
Patient dropped of SIBO test. Recorded it on SIBO tracker sheet........ put in plastic box, flipped label ( send to Kourtney Reynoso) and put up front behind Guillermo for ............

## 2025-02-07 ENCOUNTER — CLINICAL SUPPORT (OUTPATIENT)
Dept: GASTROENTEROLOGY | Facility: CLINIC | Age: 50
End: 2025-02-07
Payer: COMMERCIAL

## 2025-02-07 DIAGNOSIS — R14.0 BLOATING: Primary | ICD-10-CM

## 2025-02-07 PROCEDURE — 91065 BREATH HYDROGEN/METHANE TEST: CPT | Performed by: PHYSICIAN ASSISTANT

## 2025-02-07 NOTE — PROGRESS NOTES
[facility]       Bacterial Overgrowth Analytical Record    Gabriel Gusman 49 y.o. male MRN: [id number]      Date of Test: 2/5/25    Substrate Given: Lactulose    Ordering Provider: Faye Ferro PA-C    Medical Assistant: Annette Murrell    Symptoms: bloating    The patient presents for bacterial overgrowth testing.    Patient fasted overnight. Baseline readings obtained.   Breath test performed every 20 min for a total of 3 hr    Sample Clock Time ppmH2 ppmCH4 Co2% Han   Baseline 06:00am   3 3 3.5 1.57   #1  20 minutes NO TIME GIVEN 4 4 2.9 1.89   #2  40 minutes NO TIME GIVEN 3 3 3.5 1.57   #3  60 minutes NO TIME GIVEN 5 4 3.1 1.77   #4  80 minutes NO TIME GIVEN 5 2 3.1 1.27   #5  100 minutes NO TIME GIVEN 6 6 2.9 1.89   #6  120 minutes NO TIME GIVEN 8 4 2.8 1.96   #7  140 minutes NO TIME GIVEN 9 4 3.0 1.83   #8  160 minutes NO TIME GIVEN 11 4 2.6 2.11   #9  180 minutes NO TIME GIVEN 10 5 3.2 1.71       Physician interpretation: Negative for SIBO, presuming that the sample were provided at the correct times since none were provided with tubes 2-9.

## 2025-02-10 DIAGNOSIS — R10.13 EPIGASTRIC PAIN: Primary | ICD-10-CM

## 2025-02-11 ENCOUNTER — TELEPHONE (OUTPATIENT)
Dept: GASTROENTEROLOGY | Facility: CLINIC | Age: 50
End: 2025-02-11

## 2025-02-11 PROBLEM — K21.9 GASTROESOPHAGEAL REFLUX DISEASE: Status: RESOLVED | Noted: 2023-12-01 | Resolved: 2025-02-11

## 2025-02-11 NOTE — TELEPHONE ENCOUNTER
Faye Ferro PA-C  [facility] [place] [facility]  Please let patient know SIBO test was negative. I put in for a HIDA scan to check the function of his gallbladder thank you

## 2025-02-11 NOTE — PROGRESS NOTES
"Name: Gabriel Gusman      : 1975      MRN: [id number]  Encounter Provider: Lanie Cobb DO  Encounter Date: 2025   Encounter department: [facility]  :  Assessment & Plan  Healthcare maintenance  BP WNL   Labs UTD as below   CRC UTD   Vaccinations: Pneumo UTD, TDap UTD, Shingles reviewed, Flu given, COVID defers  Encouraged regular physical activity, varied diet, and regular dental/eye exams          Mild intermittent asthma without complication         Needs flu shot    Orders:  •  Fluzone 0.5 mL IM           History of Present Illness   HPI    Pt presents for annual physical, lab review     Lipids: Total 194, , HDL 56, TG 88 (improved); LFTs WNL  Cr 1/GFR 87  Glucose 92  CBC benign       Review of Systems   Constitutional:  Negative for unexpected weight change.   HENT:  Negative for congestion, ear pain, rhinorrhea and sore throat.         Started nasal sprays for allergies this month   Eyes:  Negative for visual disturbance.   Respiratory:  Negative for cough and shortness of breath.    Cardiovascular:  Negative for chest pain, palpitations and leg swelling.   Gastrointestinal:  Positive for abdominal distention (following with GI). Negative for abdominal pain, blood in stool, constipation and diarrhea.   Endocrine: Negative for polyuria.   Genitourinary:  Negative for difficulty urinating, dysuria and hematuria.   Neurological:  Positive for light-headedness (intermittent, usually with his GI symptoms). Negative for dizziness and headaches.   Psychiatric/Behavioral:  Negative for sleep disturbance.        Objective   /80   Pulse 90   Temp 97.7 °F (36.5 °C)   Ht 5' 7\" (1.702 m)   Wt 74.2 kg (163 lb 9.6 oz)   SpO2 97%   BMI 25.62 kg/m²      Physical Exam  Vitals and nursing note reviewed.   Constitutional:       General: He is not in acute distress.     Appearance: Normal appearance. He is well-developed.   HENT:      Head: Normocephalic and atraumatic. "      Right Ear: Tympanic membrane, ear canal and external ear normal.      Left Ear: Tympanic membrane, ear canal and external ear normal.      Nose: Nose normal. No rhinorrhea.      Mouth/Throat:      Mouth: Mucous membranes are moist.      Pharynx: No oropharyngeal exudate or posterior oropharyngeal erythema.   Eyes:      Conjunctiva/sclera: Conjunctivae normal.   Neck:      Thyroid: No thyromegaly.   Cardiovascular:      Rate and Rhythm: Normal rate and regular rhythm.   Pulmonary:      Effort: Pulmonary effort is normal. No respiratory distress.      Breath sounds: Normal breath sounds.   Abdominal:      General: Bowel sounds are normal. There is no distension.      Palpations: Abdomen is soft.      Tenderness: There is no abdominal tenderness.   Musculoskeletal:      Right lower leg: No edema.      Left lower leg: No edema.   Lymphadenopathy:      Cervical: No cervical adenopathy.   Skin:     General: Skin is warm and dry.   Neurological:      Mental Status: He is alert.      Comments: Grossly intact   Psychiatric:         Mood and Affect: Mood normal.

## 2025-02-12 ENCOUNTER — OFFICE VISIT (OUTPATIENT)
Dept: FAMILY MEDICINE CLINIC | Facility: CLINIC | Age: 50
End: 2025-02-12
Payer: COMMERCIAL

## 2025-02-12 VITALS
SYSTOLIC BLOOD PRESSURE: 122 MMHG | DIASTOLIC BLOOD PRESSURE: 80 MMHG | HEART RATE: 90 BPM | WEIGHT: 163.6 LBS | TEMPERATURE: 97.7 F | OXYGEN SATURATION: 97 % | HEIGHT: 67 IN | BODY MASS INDEX: 25.68 KG/M2

## 2025-02-12 DIAGNOSIS — J45.20 MILD INTERMITTENT ASTHMA WITHOUT COMPLICATION: ICD-10-CM

## 2025-02-12 DIAGNOSIS — Z00.00 HEALTHCARE MAINTENANCE: Primary | ICD-10-CM

## 2025-02-12 DIAGNOSIS — Z23 NEEDS FLU SHOT: ICD-10-CM

## 2025-02-12 PROCEDURE — 90471 IMMUNIZATION ADMIN: CPT

## 2025-02-12 PROCEDURE — 90656 IIV3 VACC NO PRSV 0.5 ML IM: CPT

## 2025-02-12 PROCEDURE — 99396 PREV VISIT EST AGE 40-64: CPT | Performed by: FAMILY MEDICINE

## 2025-02-18 ENCOUNTER — HOSPITAL ENCOUNTER (OUTPATIENT)
Dept: NUCLEAR MEDICINE | Facility: HOSPITAL | Age: 50
Discharge: HOME | End: 2025-02-18
Payer: COMMERCIAL

## 2025-02-18 DIAGNOSIS — R10.13 EPIGASTRIC PAIN: ICD-10-CM

## 2025-02-18 PROCEDURE — 78227 HEPATOBIL SYST IMAGE W/DRUG: CPT

## 2025-02-18 PROCEDURE — A9537 TC99M MEBROFENIN: HCPCS

## 2025-02-18 RX ORDER — SINCALIDE 5 UG/5ML
0.02 INJECTION, POWDER, LYOPHILIZED, FOR SOLUTION INTRAVENOUS
Status: COMPLETED | OUTPATIENT
Start: 2025-02-18 | End: 2025-02-18

## 2025-02-18 RX ADMIN — SINCALIDE 1.5 MCG: 5 INJECTION, POWDER, LYOPHILIZED, FOR SOLUTION INTRAVENOUS at 13:32

## 2025-02-19 ENCOUNTER — RESULTS FOLLOW-UP (OUTPATIENT)
Dept: GASTROENTEROLOGY | Facility: CLINIC | Age: 50
End: 2025-02-19

## 2025-03-21 ENCOUNTER — APPOINTMENT (OUTPATIENT)
Dept: URGENT CARE | Facility: MEDICAL CENTER | Age: 50
End: 2025-03-21

## (undated) DEVICE — .054" X 6" GUIDE WIRE
Type: IMPLANTABLE DEVICE | Site: WRIST | Status: NON-FUNCTIONAL
Brand: ACUMED
Removed: 2020-09-29

## (undated) DEVICE — SPONGE SCRUB 4 PCT CHLORHEXIDINE

## (undated) DEVICE — GLOVE SRG BIOGEL 8

## (undated) DEVICE — GLOVE INDICATOR PI UNDERGLOVE SZ 8 BLUE

## (undated) DEVICE — PAD CAST 3 IN COTTON NON STRL

## (undated) DEVICE — SUT ETHILON 4-0 PS-2 18 IN 1667H

## (undated) DEVICE — GAUZE SPONGES,16 PLY: Brand: CURITY

## (undated) DEVICE — STERILE BETHLEHEM PLASTIC HAND: Brand: CARDINAL HEALTH

## (undated) DEVICE — SPLINT 4 X 15 IN FAST SET PLASTER

## (undated) DEVICE — C-WIRE PAK DOUBLE ENDED ORTHOPAEDIC WIRE, SPADE, .062" (1.57 MM)
Type: IMPLANTABLE DEVICE | Site: WRIST | Status: NON-FUNCTIONAL
Brand: C-WIRE
Removed: 2020-09-29

## (undated) DEVICE — INTENDED FOR TISSUE SEPARATION, AND OTHER PROCEDURES THAT REQUIRE A SHARP SURGICAL BLADE TO PUNCTURE OR CUT.: Brand: BARD-PARKER ® CARBON RIB-BACK BLADES

## (undated) DEVICE — PLUMEPEN PRO 10FT

## (undated) DEVICE — ACE WRAP 3 IN STERILE

## (undated) DEVICE — BANDAGE, ESMARK LF STR 6"X9' (20/CS): Brand: CYPRESS

## (undated) DEVICE — 2.0MM QUICK RELEASE DRILL: Brand: ACUMED

## (undated) DEVICE — GLOVE SRG BIOGEL ORTHOPEDIC 8

## (undated) DEVICE — SUT ETHIBOND 3-0 RB-1 30 IN MX552

## (undated) DEVICE — LIGHT HANDLE COVER SLEEVE DISP BLUE STELLAR

## (undated) DEVICE — TUBING SUCTION 5MM X 12 FT

## (undated) DEVICE — CURITY NON-ADHERENT STRIPS: Brand: CURITY

## (undated) DEVICE — DRAPE KIT C-ARM W/PLATE PRTC FOOT SWITCH COVER

## (undated) DEVICE — STRETCH BANDAGE: Brand: CURITY

## (undated) DEVICE — 2.8MM X 5" QUICK RELEASE DRILL: Brand: ACUMED